# Patient Record
Sex: FEMALE | Race: WHITE | Employment: OTHER | URBAN - METROPOLITAN AREA
[De-identification: names, ages, dates, MRNs, and addresses within clinical notes are randomized per-mention and may not be internally consistent; named-entity substitution may affect disease eponyms.]

---

## 2017-01-24 DIAGNOSIS — G89.4 CHRONIC PAIN SYNDROME: ICD-10-CM

## 2017-01-24 RX ORDER — PROPRANOLOL HYDROCHLORIDE 10 MG/1
TABLET ORAL
Qty: 60 TABLET | Refills: 0 | Status: SHIPPED | OUTPATIENT
Start: 2017-01-24 | End: 2017-01-30 | Stop reason: SDUPTHER

## 2017-01-24 RX ORDER — PROPRANOLOL HYDROCHLORIDE 10 MG/1
TABLET ORAL
Qty: 60 TABLET | Refills: 0 | OUTPATIENT
Start: 2017-01-24

## 2017-01-24 RX ORDER — QUETIAPINE FUMARATE 25 MG/1
TABLET, FILM COATED ORAL
Qty: 30 TABLET | Refills: 0 | OUTPATIENT
Start: 2017-01-24

## 2017-01-24 RX ORDER — QUETIAPINE FUMARATE 25 MG/1
TABLET, FILM COATED ORAL
Qty: 30 TABLET | Refills: 0 | Status: SHIPPED | OUTPATIENT
Start: 2017-01-24 | End: 2017-01-30 | Stop reason: SDUPTHER

## 2017-01-25 ENCOUNTER — TELEPHONE (OUTPATIENT)
Dept: ORTHOPEDIC SURGERY | Age: 82
End: 2017-01-25

## 2017-01-27 RX ORDER — OMEPRAZOLE 40 MG/1
CAPSULE, DELAYED RELEASE ORAL
Qty: 90 CAPSULE | Refills: 1 | Status: SHIPPED | OUTPATIENT
Start: 2017-01-27 | End: 2017-04-12 | Stop reason: SDUPTHER

## 2017-01-30 ENCOUNTER — OFFICE VISIT (OUTPATIENT)
Dept: INTERNAL MEDICINE CLINIC | Age: 82
End: 2017-01-30

## 2017-01-30 ENCOUNTER — OFFICE VISIT (OUTPATIENT)
Dept: PAIN MANAGEMENT | Age: 82
End: 2017-01-30

## 2017-01-30 VITALS
BODY MASS INDEX: 23.83 KG/M2 | HEART RATE: 80 BPM | DIASTOLIC BLOOD PRESSURE: 74 MMHG | WEIGHT: 122 LBS | SYSTOLIC BLOOD PRESSURE: 132 MMHG

## 2017-01-30 VITALS
BODY MASS INDEX: 23.98 KG/M2 | TEMPERATURE: 97.4 F | SYSTOLIC BLOOD PRESSURE: 138 MMHG | HEART RATE: 48 BPM | DIASTOLIC BLOOD PRESSURE: 64 MMHG | WEIGHT: 122.8 LBS

## 2017-01-30 DIAGNOSIS — G89.4 CHRONIC PAIN SYNDROME: ICD-10-CM

## 2017-01-30 DIAGNOSIS — M47.816 OSTEOARTHRITIS OF LUMBAR SPINE, UNSPECIFIED SPINAL OSTEOARTHRITIS COMPLICATION STATUS: ICD-10-CM

## 2017-01-30 DIAGNOSIS — M96.1 POST-LAMINECTOMY SYNDROME: ICD-10-CM

## 2017-01-30 DIAGNOSIS — M48.061 SPINAL STENOSIS OF LUMBAR REGION: ICD-10-CM

## 2017-01-30 DIAGNOSIS — M25.521 ELBOW PAIN, CHRONIC, RIGHT: ICD-10-CM

## 2017-01-30 DIAGNOSIS — R21 SKIN RASH: Primary | ICD-10-CM

## 2017-01-30 DIAGNOSIS — G89.29 ELBOW PAIN, CHRONIC, RIGHT: ICD-10-CM

## 2017-01-30 PROCEDURE — 99213 OFFICE O/P EST LOW 20 MIN: CPT | Performed by: INTERNAL MEDICINE

## 2017-01-30 PROCEDURE — 99212 OFFICE O/P EST SF 10 MIN: CPT | Performed by: INTERNAL MEDICINE

## 2017-01-30 RX ORDER — DULOXETIN HYDROCHLORIDE 60 MG/1
CAPSULE, DELAYED RELEASE ORAL
Qty: 30 CAPSULE | Refills: 0 | Status: SHIPPED | OUTPATIENT
Start: 2017-01-30 | End: 2017-03-06 | Stop reason: SDUPTHER

## 2017-01-30 RX ORDER — OXYCODONE AND ACETAMINOPHEN 7.5; 325 MG/1; MG/1
1 TABLET ORAL EVERY 6 HOURS PRN
Qty: 112 TABLET | Refills: 0 | Status: SHIPPED | OUTPATIENT
Start: 2017-01-30 | End: 2017-03-06 | Stop reason: SDUPTHER

## 2017-01-30 RX ORDER — TRIAMCINOLONE ACETONIDE 5 MG/G
OINTMENT TOPICAL
Qty: 60 G | Refills: 2 | Status: SHIPPED | OUTPATIENT
Start: 2017-01-30 | End: 2017-02-06

## 2017-01-30 RX ORDER — GABAPENTIN 300 MG/1
CAPSULE ORAL
Qty: 120 CAPSULE | Refills: 0 | Status: SHIPPED | OUTPATIENT
Start: 2017-01-30 | End: 2017-03-06 | Stop reason: SDUPTHER

## 2017-01-30 RX ORDER — QUETIAPINE FUMARATE 25 MG/1
TABLET, FILM COATED ORAL
Qty: 30 TABLET | Refills: 0 | Status: SHIPPED | OUTPATIENT
Start: 2017-01-30 | End: 2017-03-06 | Stop reason: SDUPTHER

## 2017-01-30 RX ORDER — PROPRANOLOL HYDROCHLORIDE 10 MG/1
TABLET ORAL
Qty: 60 TABLET | Refills: 0 | Status: SHIPPED | OUTPATIENT
Start: 2017-01-30 | End: 2017-03-06 | Stop reason: SDUPTHER

## 2017-01-30 RX ORDER — DOCUSATE SODIUM 100 MG/1
100 CAPSULE, LIQUID FILLED ORAL 2 TIMES DAILY PRN
Qty: 60 CAPSULE | Refills: 0 | Status: SHIPPED | OUTPATIENT
Start: 2017-01-30 | End: 2017-03-06 | Stop reason: SDUPTHER

## 2017-01-30 RX ORDER — MELOXICAM 15 MG/1
TABLET ORAL
Qty: 30 TABLET | Refills: 0 | Status: SHIPPED | OUTPATIENT
Start: 2017-01-30 | End: 2017-03-06 | Stop reason: SDUPTHER

## 2017-01-31 ENCOUNTER — TELEPHONE (OUTPATIENT)
Dept: INTERNAL MEDICINE CLINIC | Age: 82
End: 2017-01-31

## 2017-02-01 RX ORDER — CLOPIDOGREL BISULFATE 75 MG/1
75 TABLET ORAL DAILY
Qty: 90 TABLET | Refills: 1 | OUTPATIENT
Start: 2017-02-01 | End: 2017-04-12 | Stop reason: SDUPTHER

## 2017-03-06 ENCOUNTER — OFFICE VISIT (OUTPATIENT)
Dept: PAIN MANAGEMENT | Age: 82
End: 2017-03-06

## 2017-03-06 VITALS
HEART RATE: 52 BPM | WEIGHT: 119 LBS | DIASTOLIC BLOOD PRESSURE: 82 MMHG | SYSTOLIC BLOOD PRESSURE: 140 MMHG | BODY MASS INDEX: 23.24 KG/M2

## 2017-03-06 DIAGNOSIS — G89.4 CHRONIC PAIN SYNDROME: ICD-10-CM

## 2017-03-06 DIAGNOSIS — M47.816 OSTEOARTHRITIS OF LUMBAR SPINE, UNSPECIFIED SPINAL OSTEOARTHRITIS COMPLICATION STATUS: ICD-10-CM

## 2017-03-06 DIAGNOSIS — F32.A MOOD DISORDER OF DEPRESSED TYPE: ICD-10-CM

## 2017-03-06 DIAGNOSIS — M25.521 ELBOW PAIN, CHRONIC, RIGHT: ICD-10-CM

## 2017-03-06 DIAGNOSIS — M48.061 SPINAL STENOSIS OF LUMBAR REGION: ICD-10-CM

## 2017-03-06 DIAGNOSIS — S13.9XXA SPRAIN OF CERVICAL NECK, INITIAL ENCOUNTER: ICD-10-CM

## 2017-03-06 DIAGNOSIS — M96.1 POST-LAMINECTOMY SYNDROME: ICD-10-CM

## 2017-03-06 DIAGNOSIS — G89.29 ELBOW PAIN, CHRONIC, RIGHT: ICD-10-CM

## 2017-03-06 PROCEDURE — 99213 OFFICE O/P EST LOW 20 MIN: CPT | Performed by: INTERNAL MEDICINE

## 2017-03-06 RX ORDER — MELOXICAM 15 MG/1
TABLET ORAL
Qty: 30 TABLET | Refills: 0 | Status: SHIPPED | OUTPATIENT
Start: 2017-03-06 | End: 2017-04-06 | Stop reason: SDUPTHER

## 2017-03-06 RX ORDER — DOCUSATE SODIUM 100 MG/1
100 CAPSULE, LIQUID FILLED ORAL 2 TIMES DAILY PRN
Qty: 60 CAPSULE | Refills: 0 | Status: SHIPPED | OUTPATIENT
Start: 2017-03-06 | End: 2017-04-06

## 2017-03-06 RX ORDER — DULOXETIN HYDROCHLORIDE 60 MG/1
CAPSULE, DELAYED RELEASE ORAL
Qty: 30 CAPSULE | Refills: 0 | Status: SHIPPED | OUTPATIENT
Start: 2017-03-06 | End: 2017-03-06 | Stop reason: SDUPTHER

## 2017-03-06 RX ORDER — OXYCODONE AND ACETAMINOPHEN 7.5; 325 MG/1; MG/1
1 TABLET ORAL EVERY 6 HOURS PRN
Qty: 112 TABLET | Refills: 0 | Status: SHIPPED | OUTPATIENT
Start: 2017-03-06 | End: 2017-04-06 | Stop reason: SDUPTHER

## 2017-03-06 RX ORDER — DULOXETIN HYDROCHLORIDE 60 MG/1
CAPSULE, DELAYED RELEASE ORAL
Qty: 30 CAPSULE | Refills: 0 | Status: SHIPPED | OUTPATIENT
Start: 2017-03-06 | End: 2017-04-06 | Stop reason: SDUPTHER

## 2017-03-06 RX ORDER — GABAPENTIN 300 MG/1
CAPSULE ORAL
Qty: 120 CAPSULE | Refills: 0 | Status: SHIPPED | OUTPATIENT
Start: 2017-03-06 | End: 2017-04-06 | Stop reason: SDUPTHER

## 2017-03-06 RX ORDER — PROPRANOLOL HYDROCHLORIDE 10 MG/1
TABLET ORAL
Qty: 60 TABLET | Refills: 0 | Status: SHIPPED | OUTPATIENT
Start: 2017-03-06 | End: 2017-04-06 | Stop reason: SDUPTHER

## 2017-03-06 RX ORDER — QUETIAPINE FUMARATE 25 MG/1
TABLET, FILM COATED ORAL
Qty: 30 TABLET | Refills: 0 | Status: SHIPPED | OUTPATIENT
Start: 2017-03-06 | End: 2017-04-06

## 2017-03-21 DIAGNOSIS — G89.4 CHRONIC PAIN SYNDROME: ICD-10-CM

## 2017-03-23 ENCOUNTER — TELEPHONE (OUTPATIENT)
Dept: INTERNAL MEDICINE CLINIC | Age: 82
End: 2017-03-23

## 2017-03-23 RX ORDER — QUETIAPINE FUMARATE 25 MG/1
TABLET, FILM COATED ORAL
Qty: 30 TABLET | Refills: 0 | Status: SHIPPED | OUTPATIENT
Start: 2017-03-23 | End: 2017-04-06 | Stop reason: SDUPTHER

## 2017-04-03 ENCOUNTER — TELEPHONE (OUTPATIENT)
Dept: INTERNAL MEDICINE CLINIC | Age: 82
End: 2017-04-03

## 2017-04-06 ENCOUNTER — OFFICE VISIT (OUTPATIENT)
Dept: PAIN MANAGEMENT | Age: 82
End: 2017-04-06

## 2017-04-06 VITALS
DIASTOLIC BLOOD PRESSURE: 80 MMHG | SYSTOLIC BLOOD PRESSURE: 133 MMHG | WEIGHT: 122 LBS | BODY MASS INDEX: 23.83 KG/M2 | HEART RATE: 89 BPM

## 2017-04-06 DIAGNOSIS — M48.061 SPINAL STENOSIS OF LUMBAR REGION: ICD-10-CM

## 2017-04-06 DIAGNOSIS — M47.816 OSTEOARTHRITIS OF LUMBAR SPINE, UNSPECIFIED SPINAL OSTEOARTHRITIS COMPLICATION STATUS: ICD-10-CM

## 2017-04-06 DIAGNOSIS — M96.1 POST-LAMINECTOMY SYNDROME: ICD-10-CM

## 2017-04-06 DIAGNOSIS — G89.4 CHRONIC PAIN SYNDROME: ICD-10-CM

## 2017-04-06 PROCEDURE — 99213 OFFICE O/P EST LOW 20 MIN: CPT | Performed by: INTERNAL MEDICINE

## 2017-04-06 RX ORDER — DULOXETIN HYDROCHLORIDE 60 MG/1
CAPSULE, DELAYED RELEASE ORAL
Qty: 30 CAPSULE | Refills: 0 | Status: SHIPPED | OUTPATIENT
Start: 2017-04-06 | End: 2017-05-04 | Stop reason: SDUPTHER

## 2017-04-06 RX ORDER — OXYCODONE AND ACETAMINOPHEN 7.5; 325 MG/1; MG/1
1 TABLET ORAL EVERY 6 HOURS PRN
Qty: 112 TABLET | Refills: 0 | Status: SHIPPED | OUTPATIENT
Start: 2017-04-06 | End: 2017-05-04 | Stop reason: SDUPTHER

## 2017-04-06 RX ORDER — QUETIAPINE FUMARATE 25 MG/1
TABLET, FILM COATED ORAL
Qty: 30 TABLET | Refills: 0 | Status: SHIPPED | OUTPATIENT
Start: 2017-04-06 | End: 2017-05-04 | Stop reason: SDUPTHER

## 2017-04-06 RX ORDER — GABAPENTIN 300 MG/1
CAPSULE ORAL
Qty: 120 CAPSULE | Refills: 0 | Status: SHIPPED | OUTPATIENT
Start: 2017-04-06 | End: 2017-05-04 | Stop reason: SDUPTHER

## 2017-04-06 RX ORDER — PROPRANOLOL HYDROCHLORIDE 10 MG/1
TABLET ORAL
Qty: 60 TABLET | Refills: 0 | Status: SHIPPED | OUTPATIENT
Start: 2017-04-06 | End: 2017-05-04

## 2017-04-06 RX ORDER — MELOXICAM 15 MG/1
TABLET ORAL
Qty: 30 TABLET | Refills: 0 | Status: SHIPPED | OUTPATIENT
Start: 2017-04-06 | End: 2017-05-04 | Stop reason: SDUPTHER

## 2017-04-08 DIAGNOSIS — M47.816 OSTEOARTHRITIS OF LUMBAR SPINE, UNSPECIFIED SPINAL OSTEOARTHRITIS COMPLICATION STATUS: ICD-10-CM

## 2017-04-08 DIAGNOSIS — G89.29 ELBOW PAIN, CHRONIC, RIGHT: ICD-10-CM

## 2017-04-08 DIAGNOSIS — G89.4 CHRONIC PAIN SYNDROME: ICD-10-CM

## 2017-04-08 DIAGNOSIS — M48.061 SPINAL STENOSIS OF LUMBAR REGION: ICD-10-CM

## 2017-04-08 DIAGNOSIS — M96.1 POST-LAMINECTOMY SYNDROME: ICD-10-CM

## 2017-04-08 DIAGNOSIS — M25.521 ELBOW PAIN, CHRONIC, RIGHT: ICD-10-CM

## 2017-04-11 ENCOUNTER — TELEPHONE (OUTPATIENT)
Dept: INTERNAL MEDICINE CLINIC | Age: 82
End: 2017-04-11

## 2017-04-12 RX ORDER — CLOPIDOGREL BISULFATE 75 MG/1
75 TABLET ORAL DAILY
Qty: 90 TABLET | Refills: 1 | Status: SHIPPED | OUTPATIENT
Start: 2017-04-12 | End: 2017-08-16

## 2017-04-12 RX ORDER — ASPIRIN 81 MG/1
81 TABLET ORAL DAILY
Qty: 90 TABLET | Refills: 1 | Status: SHIPPED | OUTPATIENT
Start: 2017-04-12 | End: 2017-08-16

## 2017-04-12 RX ORDER — GABAPENTIN 300 MG/1
CAPSULE ORAL
Qty: 120 CAPSULE | Refills: 0 | OUTPATIENT
Start: 2017-04-12

## 2017-04-12 RX ORDER — OMEPRAZOLE 40 MG/1
CAPSULE, DELAYED RELEASE ORAL
Qty: 90 CAPSULE | Refills: 1 | Status: SHIPPED | OUTPATIENT
Start: 2017-04-12 | End: 2017-07-03 | Stop reason: SDUPTHER

## 2017-04-12 RX ORDER — DULOXETIN HYDROCHLORIDE 60 MG/1
CAPSULE, DELAYED RELEASE ORAL
Qty: 30 CAPSULE | Refills: 0 | OUTPATIENT
Start: 2017-04-12

## 2017-05-04 ENCOUNTER — OFFICE VISIT (OUTPATIENT)
Dept: PAIN MANAGEMENT | Age: 82
End: 2017-05-04

## 2017-05-04 VITALS
HEART RATE: 50 BPM | DIASTOLIC BLOOD PRESSURE: 96 MMHG | SYSTOLIC BLOOD PRESSURE: 191 MMHG | BODY MASS INDEX: 24.14 KG/M2 | WEIGHT: 123.6 LBS

## 2017-05-04 DIAGNOSIS — M48.061 SPINAL STENOSIS OF LUMBAR REGION: ICD-10-CM

## 2017-05-04 DIAGNOSIS — M47.816 OSTEOARTHRITIS OF LUMBAR SPINE, UNSPECIFIED SPINAL OSTEOARTHRITIS COMPLICATION STATUS: ICD-10-CM

## 2017-05-04 DIAGNOSIS — G89.4 CHRONIC PAIN SYNDROME: ICD-10-CM

## 2017-05-04 DIAGNOSIS — M96.1 POST-LAMINECTOMY SYNDROME: ICD-10-CM

## 2017-05-04 PROCEDURE — 99213 OFFICE O/P EST LOW 20 MIN: CPT | Performed by: INTERNAL MEDICINE

## 2017-05-04 RX ORDER — MELOXICAM 15 MG/1
TABLET ORAL
Qty: 30 TABLET | Refills: 0 | Status: SHIPPED | OUTPATIENT
Start: 2017-05-04 | End: 2017-06-26 | Stop reason: SDUPTHER

## 2017-05-04 RX ORDER — QUETIAPINE FUMARATE 25 MG/1
TABLET, FILM COATED ORAL
Qty: 30 TABLET | Refills: 0 | Status: SHIPPED | OUTPATIENT
Start: 2017-05-04 | End: 2017-06-26 | Stop reason: SDUPTHER

## 2017-05-04 RX ORDER — OXYCODONE AND ACETAMINOPHEN 7.5; 325 MG/1; MG/1
1 TABLET ORAL EVERY 6 HOURS PRN
Qty: 140 TABLET | Refills: 0 | Status: SHIPPED | OUTPATIENT
Start: 2017-05-04 | End: 2017-06-26 | Stop reason: SDUPTHER

## 2017-05-04 RX ORDER — GABAPENTIN 300 MG/1
CAPSULE ORAL
Qty: 120 CAPSULE | Refills: 0 | Status: SHIPPED | OUTPATIENT
Start: 2017-05-04 | End: 2017-06-26 | Stop reason: SDUPTHER

## 2017-05-04 RX ORDER — DULOXETIN HYDROCHLORIDE 60 MG/1
CAPSULE, DELAYED RELEASE ORAL
Qty: 30 CAPSULE | Refills: 0 | Status: SHIPPED | OUTPATIENT
Start: 2017-05-04 | End: 2017-06-26 | Stop reason: SDUPTHER

## 2017-05-05 ENCOUNTER — TELEPHONE (OUTPATIENT)
Dept: INTERNAL MEDICINE CLINIC | Age: 82
End: 2017-05-05

## 2017-05-16 ENCOUNTER — OFFICE VISIT (OUTPATIENT)
Dept: INTERNAL MEDICINE CLINIC | Age: 82
End: 2017-05-16

## 2017-05-16 VITALS
WEIGHT: 121 LBS | BODY MASS INDEX: 23.63 KG/M2 | TEMPERATURE: 98 F | DIASTOLIC BLOOD PRESSURE: 74 MMHG | OXYGEN SATURATION: 98 % | SYSTOLIC BLOOD PRESSURE: 164 MMHG | HEART RATE: 59 BPM

## 2017-05-16 DIAGNOSIS — R21 RASH AND NONSPECIFIC SKIN ERUPTION: ICD-10-CM

## 2017-05-16 DIAGNOSIS — I10 ESSENTIAL HYPERTENSION: Primary | ICD-10-CM

## 2017-05-16 PROCEDURE — 99213 OFFICE O/P EST LOW 20 MIN: CPT | Performed by: NURSE PRACTITIONER

## 2017-05-16 RX ORDER — ALBUTEROL SULFATE 90 UG/1
AEROSOL, METERED RESPIRATORY (INHALATION)
Qty: 1 INHALER | Refills: 5 | Status: SHIPPED | OUTPATIENT
Start: 2017-05-16 | End: 2018-09-05 | Stop reason: SDUPTHER

## 2017-05-16 RX ORDER — BLOOD PRESSURE TEST KIT
KIT MISCELLANEOUS
Qty: 1 KIT | Refills: 0 | Status: SHIPPED | OUTPATIENT
Start: 2017-05-16 | End: 2018-01-01 | Stop reason: CLARIF

## 2017-05-16 RX ORDER — LISINOPRIL 5 MG/1
5 TABLET ORAL DAILY
Qty: 30 TABLET | Refills: 3 | Status: ON HOLD | OUTPATIENT
Start: 2017-05-16 | End: 2017-06-14 | Stop reason: HOSPADM

## 2017-06-08 ENCOUNTER — TELEPHONE (OUTPATIENT)
Dept: PAIN MANAGEMENT | Age: 82
End: 2017-06-08

## 2017-06-26 ENCOUNTER — OFFICE VISIT (OUTPATIENT)
Dept: PAIN MANAGEMENT | Age: 82
End: 2017-06-26

## 2017-06-26 VITALS
WEIGHT: 122 LBS | BODY MASS INDEX: 23.83 KG/M2 | HEART RATE: 69 BPM | DIASTOLIC BLOOD PRESSURE: 89 MMHG | SYSTOLIC BLOOD PRESSURE: 116 MMHG

## 2017-06-26 DIAGNOSIS — I77.9 PERIPHERAL ARTERIAL OCCLUSIVE DISEASE (HCC): ICD-10-CM

## 2017-06-26 DIAGNOSIS — G89.4 CHRONIC PAIN SYNDROME: ICD-10-CM

## 2017-06-26 DIAGNOSIS — M48.061 SPINAL STENOSIS OF LUMBAR REGION: ICD-10-CM

## 2017-06-26 DIAGNOSIS — M96.1 POST-LAMINECTOMY SYNDROME: ICD-10-CM

## 2017-06-26 PROCEDURE — 99213 OFFICE O/P EST LOW 20 MIN: CPT | Performed by: INTERNAL MEDICINE

## 2017-06-26 RX ORDER — DULOXETIN HYDROCHLORIDE 60 MG/1
CAPSULE, DELAYED RELEASE ORAL
Qty: 30 CAPSULE | Refills: 0 | Status: SHIPPED | OUTPATIENT
Start: 2017-06-26 | End: 2017-07-24 | Stop reason: SDUPTHER

## 2017-06-26 RX ORDER — OXYCODONE AND ACETAMINOPHEN 7.5; 325 MG/1; MG/1
1 TABLET ORAL EVERY 6 HOURS PRN
Qty: 112 TABLET | Refills: 0 | Status: SHIPPED | OUTPATIENT
Start: 2017-06-26 | End: 2017-07-24 | Stop reason: SDUPTHER

## 2017-06-26 RX ORDER — MELOXICAM 15 MG/1
TABLET ORAL
Qty: 30 TABLET | Refills: 0 | Status: SHIPPED | OUTPATIENT
Start: 2017-06-26 | End: 2017-07-24 | Stop reason: SDUPTHER

## 2017-06-26 RX ORDER — QUETIAPINE FUMARATE 25 MG/1
TABLET, FILM COATED ORAL
Qty: 30 TABLET | Refills: 0 | Status: SHIPPED | OUTPATIENT
Start: 2017-06-26 | End: 2017-07-24 | Stop reason: SDUPTHER

## 2017-06-26 RX ORDER — GABAPENTIN 300 MG/1
CAPSULE ORAL
Qty: 120 CAPSULE | Refills: 0 | Status: SHIPPED | OUTPATIENT
Start: 2017-06-26 | End: 2017-07-24 | Stop reason: SDUPTHER

## 2017-07-03 ENCOUNTER — OFFICE VISIT (OUTPATIENT)
Dept: INTERNAL MEDICINE CLINIC | Age: 82
End: 2017-07-03

## 2017-07-03 VITALS
HEIGHT: 59 IN | HEART RATE: 92 BPM | WEIGHT: 120.6 LBS | BODY MASS INDEX: 24.31 KG/M2 | TEMPERATURE: 98.3 F | DIASTOLIC BLOOD PRESSURE: 78 MMHG | SYSTOLIC BLOOD PRESSURE: 136 MMHG

## 2017-07-03 DIAGNOSIS — I10 ESSENTIAL HYPERTENSION: ICD-10-CM

## 2017-07-03 DIAGNOSIS — I71.40 ABDOMINAL AORTIC ANEURYSM (AAA) WITHOUT RUPTURE: Primary | ICD-10-CM

## 2017-07-03 DIAGNOSIS — K21.9 GASTROESOPHAGEAL REFLUX DISEASE WITHOUT ESOPHAGITIS: ICD-10-CM

## 2017-07-03 PROCEDURE — 99213 OFFICE O/P EST LOW 20 MIN: CPT | Performed by: NURSE PRACTITIONER

## 2017-07-03 RX ORDER — OMEPRAZOLE 40 MG/1
CAPSULE, DELAYED RELEASE ORAL
Qty: 180 CAPSULE | Refills: 1 | Status: SHIPPED | OUTPATIENT
Start: 2017-07-03

## 2017-07-24 ENCOUNTER — OFFICE VISIT (OUTPATIENT)
Dept: PAIN MANAGEMENT | Age: 82
End: 2017-07-24

## 2017-07-24 VITALS
WEIGHT: 121 LBS | HEART RATE: 48 BPM | DIASTOLIC BLOOD PRESSURE: 89 MMHG | SYSTOLIC BLOOD PRESSURE: 140 MMHG | BODY MASS INDEX: 24.44 KG/M2

## 2017-07-24 DIAGNOSIS — M96.1 POST-LAMINECTOMY SYNDROME: ICD-10-CM

## 2017-07-24 DIAGNOSIS — G89.4 CHRONIC PAIN SYNDROME: ICD-10-CM

## 2017-07-24 DIAGNOSIS — M48.061 SPINAL STENOSIS OF LUMBAR REGION: ICD-10-CM

## 2017-07-24 PROCEDURE — 99213 OFFICE O/P EST LOW 20 MIN: CPT | Performed by: INTERNAL MEDICINE

## 2017-07-24 RX ORDER — GABAPENTIN 300 MG/1
CAPSULE ORAL
Qty: 120 CAPSULE | Refills: 1 | Status: SHIPPED | OUTPATIENT
Start: 2017-07-24 | End: 2017-08-29 | Stop reason: SDUPTHER

## 2017-07-24 RX ORDER — QUETIAPINE FUMARATE 25 MG/1
TABLET, FILM COATED ORAL
Qty: 30 TABLET | Refills: 0 | Status: SHIPPED | OUTPATIENT
Start: 2017-07-24 | End: 2017-08-29 | Stop reason: SDUPTHER

## 2017-07-24 RX ORDER — DULOXETIN HYDROCHLORIDE 60 MG/1
CAPSULE, DELAYED RELEASE ORAL
Qty: 30 CAPSULE | Refills: 1 | Status: SHIPPED | OUTPATIENT
Start: 2017-07-24 | End: 2017-08-29 | Stop reason: SDUPTHER

## 2017-07-24 RX ORDER — MELOXICAM 15 MG/1
TABLET ORAL
Qty: 30 TABLET | Refills: 1 | Status: SHIPPED | OUTPATIENT
Start: 2017-07-24 | End: 2017-08-29 | Stop reason: SDUPTHER

## 2017-07-24 RX ORDER — OXYCODONE AND ACETAMINOPHEN 7.5; 325 MG/1; MG/1
1 TABLET ORAL EVERY 6 HOURS PRN
Qty: 140 TABLET | Refills: 0 | Status: SHIPPED | OUTPATIENT
Start: 2017-07-24 | End: 2017-08-29 | Stop reason: SDUPTHER

## 2017-08-09 ENCOUNTER — TELEPHONE (OUTPATIENT)
Dept: INTERNAL MEDICINE CLINIC | Age: 82
End: 2017-08-09

## 2017-08-10 ENCOUNTER — TELEPHONE (OUTPATIENT)
Dept: INTERNAL MEDICINE CLINIC | Age: 82
End: 2017-08-10

## 2017-08-11 ENCOUNTER — TELEPHONE (OUTPATIENT)
Dept: INTERNAL MEDICINE CLINIC | Age: 82
End: 2017-08-11

## 2017-08-11 DIAGNOSIS — L60.8 NAIL DISCOLORATION: Primary | ICD-10-CM

## 2017-08-14 ENCOUNTER — OFFICE VISIT (OUTPATIENT)
Dept: INTERNAL MEDICINE CLINIC | Age: 82
End: 2017-08-14

## 2017-08-14 VITALS
BODY MASS INDEX: 23.75 KG/M2 | OXYGEN SATURATION: 97 % | SYSTOLIC BLOOD PRESSURE: 142 MMHG | TEMPERATURE: 97.4 F | WEIGHT: 117.6 LBS | HEART RATE: 52 BPM | DIASTOLIC BLOOD PRESSURE: 80 MMHG

## 2017-08-14 DIAGNOSIS — I48.19 PERSISTENT ATRIAL FIBRILLATION (HCC): ICD-10-CM

## 2017-08-14 DIAGNOSIS — M19.90 ARTHRITIS: ICD-10-CM

## 2017-08-14 DIAGNOSIS — B35.1 ONYCHOMYCOSIS: Primary | ICD-10-CM

## 2017-08-14 PROCEDURE — 99213 OFFICE O/P EST LOW 20 MIN: CPT | Performed by: NURSE PRACTITIONER

## 2017-08-14 RX ORDER — TERBINAFINE HYDROCHLORIDE 250 MG/1
250 TABLET ORAL DAILY
Qty: 90 TABLET | Refills: 0 | Status: SHIPPED | OUTPATIENT
Start: 2017-08-14 | End: 2017-11-18 | Stop reason: SDUPTHER

## 2017-08-14 ASSESSMENT — PATIENT HEALTH QUESTIONNAIRE - PHQ9
SUM OF ALL RESPONSES TO PHQ QUESTIONS 1-9: 0
1. LITTLE INTEREST OR PLEASURE IN DOING THINGS: 0
2. FEELING DOWN, DEPRESSED OR HOPELESS: 0
SUM OF ALL RESPONSES TO PHQ9 QUESTIONS 1 & 2: 0

## 2017-08-16 ENCOUNTER — OFFICE VISIT (OUTPATIENT)
Dept: CARDIOLOGY CLINIC | Age: 82
End: 2017-08-16

## 2017-08-16 VITALS
DIASTOLIC BLOOD PRESSURE: 58 MMHG | HEART RATE: 60 BPM | BODY MASS INDEX: 20.55 KG/M2 | SYSTOLIC BLOOD PRESSURE: 120 MMHG | WEIGHT: 116 LBS | HEIGHT: 63 IN

## 2017-08-16 DIAGNOSIS — I36.1 NON-RHEUMATIC TRICUSPID VALVE INSUFFICIENCY: ICD-10-CM

## 2017-08-16 DIAGNOSIS — I71.40 ABDOMINAL AORTIC ANEURYSM (AAA) WITHOUT RUPTURE: ICD-10-CM

## 2017-08-16 DIAGNOSIS — I34.0 NONRHEUMATIC MITRAL VALVE REGURGITATION: ICD-10-CM

## 2017-08-16 DIAGNOSIS — I77.9 PERIPHERAL ARTERIAL OCCLUSIVE DISEASE (HCC): ICD-10-CM

## 2017-08-16 DIAGNOSIS — I48.0 PAROXYSMAL ATRIAL FIBRILLATION (HCC): Primary | ICD-10-CM

## 2017-08-16 DIAGNOSIS — I10 ESSENTIAL HYPERTENSION: ICD-10-CM

## 2017-08-16 PROCEDURE — 99215 OFFICE O/P EST HI 40 MIN: CPT | Performed by: INTERNAL MEDICINE

## 2017-08-29 ENCOUNTER — OFFICE VISIT (OUTPATIENT)
Dept: PAIN MANAGEMENT | Age: 82
End: 2017-08-29

## 2017-08-29 VITALS
DIASTOLIC BLOOD PRESSURE: 103 MMHG | BODY MASS INDEX: 21.08 KG/M2 | SYSTOLIC BLOOD PRESSURE: 201 MMHG | WEIGHT: 119 LBS | HEART RATE: 45 BPM

## 2017-08-29 DIAGNOSIS — M96.1 POST-LAMINECTOMY SYNDROME: ICD-10-CM

## 2017-08-29 DIAGNOSIS — M47.816 OSTEOARTHRITIS OF LUMBAR SPINE, UNSPECIFIED SPINAL OSTEOARTHRITIS COMPLICATION STATUS: ICD-10-CM

## 2017-08-29 DIAGNOSIS — M48.061 SPINAL STENOSIS OF LUMBAR REGION: ICD-10-CM

## 2017-08-29 DIAGNOSIS — G89.4 CHRONIC PAIN SYNDROME: ICD-10-CM

## 2017-08-29 PROCEDURE — 99213 OFFICE O/P EST LOW 20 MIN: CPT | Performed by: INTERNAL MEDICINE

## 2017-08-29 RX ORDER — OXYCODONE AND ACETAMINOPHEN 7.5; 325 MG/1; MG/1
1 TABLET ORAL EVERY 6 HOURS PRN
Qty: 112 TABLET | Refills: 0 | Status: SHIPPED | OUTPATIENT
Start: 2017-08-29 | End: 2017-10-03 | Stop reason: SDUPTHER

## 2017-08-29 RX ORDER — QUETIAPINE FUMARATE 25 MG/1
TABLET, FILM COATED ORAL
Qty: 30 TABLET | Refills: 0 | Status: SHIPPED | OUTPATIENT
Start: 2017-08-29 | End: 2017-10-03 | Stop reason: SDUPTHER

## 2017-08-29 RX ORDER — GABAPENTIN 300 MG/1
CAPSULE ORAL
Qty: 120 CAPSULE | Refills: 1 | Status: SHIPPED | OUTPATIENT
Start: 2017-08-29 | End: 2017-10-03 | Stop reason: SDUPTHER

## 2017-08-29 RX ORDER — DULOXETIN HYDROCHLORIDE 60 MG/1
CAPSULE, DELAYED RELEASE ORAL
Qty: 30 CAPSULE | Refills: 1 | Status: SHIPPED | OUTPATIENT
Start: 2017-08-29 | End: 2017-10-03 | Stop reason: SDUPTHER

## 2017-08-29 RX ORDER — MELOXICAM 15 MG/1
TABLET ORAL
Qty: 30 TABLET | Refills: 1 | Status: SHIPPED | OUTPATIENT
Start: 2017-08-29 | End: 2017-10-03 | Stop reason: SDUPTHER

## 2017-09-25 ENCOUNTER — TELEPHONE (OUTPATIENT)
Dept: INTERNAL MEDICINE CLINIC | Age: 82
End: 2017-09-25

## 2017-10-03 ENCOUNTER — OFFICE VISIT (OUTPATIENT)
Dept: PAIN MANAGEMENT | Age: 82
End: 2017-10-03

## 2017-10-03 VITALS
DIASTOLIC BLOOD PRESSURE: 66 MMHG | WEIGHT: 112 LBS | HEART RATE: 58 BPM | BODY MASS INDEX: 19.84 KG/M2 | SYSTOLIC BLOOD PRESSURE: 136 MMHG

## 2017-10-03 DIAGNOSIS — M48.061 SPINAL STENOSIS OF LUMBAR REGION: ICD-10-CM

## 2017-10-03 DIAGNOSIS — M96.1 POST-LAMINECTOMY SYNDROME: ICD-10-CM

## 2017-10-03 DIAGNOSIS — G89.4 CHRONIC PAIN SYNDROME: ICD-10-CM

## 2017-10-03 DIAGNOSIS — M47.816 OSTEOARTHRITIS OF LUMBAR SPINE, UNSPECIFIED SPINAL OSTEOARTHRITIS COMPLICATION STATUS: ICD-10-CM

## 2017-10-03 PROCEDURE — 99213 OFFICE O/P EST LOW 20 MIN: CPT | Performed by: INTERNAL MEDICINE

## 2017-10-03 RX ORDER — MELOXICAM 15 MG/1
TABLET ORAL
Qty: 30 TABLET | Refills: 0 | Status: SHIPPED | OUTPATIENT
Start: 2017-10-03 | End: 2017-10-30 | Stop reason: SDUPTHER

## 2017-10-03 RX ORDER — QUETIAPINE FUMARATE 25 MG/1
TABLET, FILM COATED ORAL
Qty: 30 TABLET | Refills: 0 | Status: SHIPPED | OUTPATIENT
Start: 2017-10-03 | End: 2017-10-30 | Stop reason: SDUPTHER

## 2017-10-03 RX ORDER — DULOXETIN HYDROCHLORIDE 60 MG/1
CAPSULE, DELAYED RELEASE ORAL
Qty: 30 CAPSULE | Refills: 0 | Status: SHIPPED | OUTPATIENT
Start: 2017-10-03 | End: 2017-10-30 | Stop reason: SDUPTHER

## 2017-10-03 RX ORDER — OXYCODONE AND ACETAMINOPHEN 7.5; 325 MG/1; MG/1
1 TABLET ORAL EVERY 6 HOURS PRN
Qty: 112 TABLET | Refills: 0 | Status: SHIPPED | OUTPATIENT
Start: 2017-10-03 | End: 2017-10-30 | Stop reason: SDUPTHER

## 2017-10-03 RX ORDER — GABAPENTIN 300 MG/1
CAPSULE ORAL
Qty: 120 CAPSULE | Refills: 0 | Status: SHIPPED | OUTPATIENT
Start: 2017-10-03 | End: 2017-10-30 | Stop reason: SDUPTHER

## 2017-10-03 NOTE — PROGRESS NOTES
Karyn Craft  1934  [id number]    HISTORY OF PRESENT ILLNESS:  Ms. Ada Bennett is a 80 y.o. female returns for a follow up visit for multiple medical problems. Her current presenting problems are   1. Chronic pain syndrome    2. Spinal stenosis of lumbar region    3. Post-laminectomy syndrome    4. Osteoarthritis of lumbar spine, unspecified spinal osteoarthritis complication status    5. Primary insomnia    6. Mood disorder of depressed type    7. Constipation, unspecified constipation type    . As per information/history obtained from the PADT(patient assessment and documentation tool) - She complains of pain in the upper back, mid back and lower back with radiation to the elbows Right, buttocks, knees Right and feet Right She rates the pain 5/10 and describes it as sharp, aching. Pain is made worse by: walking, standing, bending, lifting. Current treatment regimen has helped relieve about 50% of the pain. She denies side effects from the current pain regimen. Patient reports that since the last follow up visit the physical functioning is better, family/social relationships are better, mood is better and sleep patterns are better, and that the overall functioning is better. Patient denies neurological bowel or bladder. Patient denies misusing/abusing her narcotic pain medications or using any illegal drugs. There are No indicators for possible drug abuse, addiction or diversion problems. Upon obtaining the medical history from Ms. Ada Bennett regarding today's office visit for her presenting problems, patient states she has not been feeling well, has URI and bronchitis symptoms. She states she is smoking e-cigs still. Ms. Ada Bennett mentions using Cymbalta still. Patient's  subjective report of her mood is fair. she describes occasional symptoms of depression, occasional  irritability and some mood swings. Describes her mood as being neutral and reports some pleasure in her daily activities.  Reports fair  appetite, energy and concentration. Able to function well in different aspects of her daily activities. Denies suicidal or homicidal ideation. Denies any complaints of increased tension, does   Worry sometimes and occasional  irritability  she denies any c/o increased anxiety, No c/o panic attacks or symptoms of PTSD. Patient reports she lives with her son. ALLERGIES: Patients list of allergies were reviewed     MEDICATIONS: Ms. Jocelin Melo list of medications were reviewed. Her current medications are   Outpatient Medications Prior to Visit   Medication Sig Dispense Refill    oxyCODONE-acetaminophen (PERCOCET) 7.5-325 MG per tablet Take 1 tablet by mouth every 6 hours as needed for Pain  Max 4 per day. 112 tablet 0    QUEtiapine (SEROQUEL) 25 MG tablet TAKE 1 TABLET BY MOUTH NIGHTLY 30 tablet 0    DULoxetine (CYMBALTA) 60 MG extended release capsule TAKE 1 CAPSULE BY MOUTH DAILY.  30 capsule 1    gabapentin (NEURONTIN) 300 MG capsule Take one capsule in the morning, one capsule in the afternoon and two capsules at night 120 capsule 1    meloxicam (MOBIC) 15 MG tablet Take one tablet Monday,wednesday and Friday 30 tablet 1    rivaroxaban (XARELTO) 15 MG TABS tablet Take 1 tablet by mouth daily (with breakfast) 90 tablet 3    terbinafine (LAMISIL) 250 MG tablet Take 1 tablet by mouth daily 90 tablet 0    omeprazole (PRILOSEC) 40 MG delayed release capsule TAKE 1 CAPSULE BY MOUTH DAILY 180 capsule 1    tiotropium (SPIRIVA) 18 MCG inhalation capsule Inhale 1 capsule into the lungs daily 90 capsule 1    metoprolol tartrate (LOPRESSOR) 25 MG tablet Take 1 tablet by mouth 2 times daily 60 tablet 3    lisinopril (PRINIVIL;ZESTRIL) 20 MG tablet Take 1 tablet by mouth daily 30 tablet 3    Blood Pressure KIT Check bp once daily 1 kit 0    fluticasone-salmeterol (ADVAIR DISKUS) 250-50 MCG/DOSE AEPB INHALE 1 PUFF INTO THE LUNGS EVERY 12 HOURS 1 Inhaler 5    albuterol sulfate HFA (PROAIR HFA) 108 (90 BASE) MCG/ACT inhaler INHALE 2 PUFFS INTO THE LUNGS EVERY 4 HOURS AS NEEDED FOR WHEEZING. 1 Inhaler 5    Homeopathic Products (LEG CRAMP RELIEF PO) Take by mouth as needed      ferrous sulfate 325 (65 FE) MG EC tablet Take 325 mg by mouth daily (with breakfast)      Psyllium (METAMUCIL MULTIHEALTH FIBER PO) Take 2 capsules by mouth daily        No facility-administered medications prior to visit. SOCIAL/FAMILY/PAST MEDICAL HISTORY: Ms. Enoch Du, family and past medical history was reviewed. REVIEW OF SYSTEMS:    Respiratory: Negative for apnea, chest tightness and shortness of breath or change in baseline breathing. Gastrointestinal: Negative for nausea, vomiting, abdominal pain, diarrhea, constipation, blood in stool and abdominal distention. PHYSICAL EXAM:   Nursing note and vitals reviewed. /66 (Site: Right Arm, Position: Sitting)  Pulse 58  Wt 112 lb (50.8 kg)  Breastfeeding? No  BMI 19.84 kg/m2  Constitutional: She appears well-developed and well-nourished. No acute distress. Skin: Skin is warm and dry, good turgor. No rash noted. She is not diaphoretic. Cardiovascular: Normal rate, regular rhythm, normal heart sounds, and has 1/6 SM murmur. Pulmonary/Chest: Effort normal. No respiratory distress. She does not have wheezes in the lung fields. She has no rales. +rhonchi, + wheeze, decreased ROM     Neurological/Psychiatric:She is alert and oriented to person, place, and time. Coordination is  normal. Her mood isAppropriate and affect is Flat/blunted and Anxious . IMPRESSION:   1. Chronic pain syndrome    2. Spinal stenosis of lumbar region    3. Post-laminectomy syndrome    4.  Osteoarthritis of lumbar spine, unspecified spinal osteoarthritis complication status        PLAN:  Informed verbal consent was obtained  -ROM/stretching exercises as advised   -Continue with Percocet 3-4 times per day   -Follow up with PCP/urgent care for her COPD issues   -She was advised to machinery while adjusting the dose of medicines or if having cognitive  issues related to the current medications. Risk of overdose and death, if medicines not taken as prescribed, were also discussed. If the patient develops new symptoms or if the symptoms worsen, the patient should call the office. While transcribing every attempt was made to maintain the accuracy of the note in terms of it's contents,there may have been some errors made inadvertently. Thank you for allowing me to participate in the care of this patient. Tonya Pulliam MD.    Cc: Cinthia Rosenbaum MD    I, Shelia Chun am scribing for and in the presence of Dr. Tonya Pulliam.    10/03/17  11:10 AM  JAQUELINE Ayon, Dr. Tonya Pulliam, personally performed the services described in this documentation as scribed by   Shelia Chun MA in my presence and it is both accurate and complete

## 2017-10-30 ENCOUNTER — OFFICE VISIT (OUTPATIENT)
Dept: PAIN MANAGEMENT | Age: 82
End: 2017-10-30

## 2017-10-30 VITALS
DIASTOLIC BLOOD PRESSURE: 78 MMHG | WEIGHT: 112 LBS | SYSTOLIC BLOOD PRESSURE: 133 MMHG | BODY MASS INDEX: 19.84 KG/M2 | HEART RATE: 68 BPM

## 2017-10-30 DIAGNOSIS — R25.1 TREMORS OF NERVOUS SYSTEM: ICD-10-CM

## 2017-10-30 DIAGNOSIS — G89.4 CHRONIC PAIN SYNDROME: ICD-10-CM

## 2017-10-30 DIAGNOSIS — M47.816 LUMBAR SPONDYLOSIS: ICD-10-CM

## 2017-10-30 DIAGNOSIS — M47.816 OSTEOARTHRITIS OF LUMBAR SPINE, UNSPECIFIED SPINAL OSTEOARTHRITIS COMPLICATION STATUS: ICD-10-CM

## 2017-10-30 DIAGNOSIS — M48.062 SPINAL STENOSIS OF LUMBAR REGION WITH NEUROGENIC CLAUDICATION: ICD-10-CM

## 2017-10-30 DIAGNOSIS — M96.1 POST-LAMINECTOMY SYNDROME: ICD-10-CM

## 2017-10-30 PROCEDURE — G8598 ASA/ANTIPLAT THER USED: HCPCS | Performed by: INTERNAL MEDICINE

## 2017-10-30 PROCEDURE — G8427 DOCREV CUR MEDS BY ELIG CLIN: HCPCS | Performed by: INTERNAL MEDICINE

## 2017-10-30 PROCEDURE — 4040F PNEUMOC VAC/ADMIN/RCVD: CPT | Performed by: INTERNAL MEDICINE

## 2017-10-30 PROCEDURE — G8420 CALC BMI NORM PARAMETERS: HCPCS | Performed by: INTERNAL MEDICINE

## 2017-10-30 PROCEDURE — 1090F PRES/ABSN URINE INCON ASSESS: CPT | Performed by: INTERNAL MEDICINE

## 2017-10-30 PROCEDURE — 1123F ACP DISCUSS/DSCN MKR DOCD: CPT | Performed by: INTERNAL MEDICINE

## 2017-10-30 PROCEDURE — G8400 PT W/DXA NO RESULTS DOC: HCPCS | Performed by: INTERNAL MEDICINE

## 2017-10-30 PROCEDURE — G8484 FLU IMMUNIZE NO ADMIN: HCPCS | Performed by: INTERNAL MEDICINE

## 2017-10-30 PROCEDURE — 99213 OFFICE O/P EST LOW 20 MIN: CPT | Performed by: INTERNAL MEDICINE

## 2017-10-30 PROCEDURE — 1036F TOBACCO NON-USER: CPT | Performed by: INTERNAL MEDICINE

## 2017-10-30 RX ORDER — QUETIAPINE FUMARATE 25 MG/1
TABLET, FILM COATED ORAL
Qty: 30 TABLET | Refills: 1 | Status: SHIPPED | OUTPATIENT
Start: 2017-10-30 | End: 2017-11-28 | Stop reason: SDUPTHER

## 2017-10-30 RX ORDER — GABAPENTIN 300 MG/1
CAPSULE ORAL
Qty: 120 CAPSULE | Refills: 1 | Status: SHIPPED | OUTPATIENT
Start: 2017-10-30 | End: 2017-11-28 | Stop reason: SDUPTHER

## 2017-10-30 RX ORDER — DULOXETIN HYDROCHLORIDE 60 MG/1
CAPSULE, DELAYED RELEASE ORAL
Qty: 30 CAPSULE | Refills: 1 | Status: SHIPPED | OUTPATIENT
Start: 2017-10-30 | End: 2017-11-28 | Stop reason: SDUPTHER

## 2017-10-30 RX ORDER — MELOXICAM 15 MG/1
TABLET ORAL
Qty: 30 TABLET | Refills: 1 | Status: SHIPPED | OUTPATIENT
Start: 2017-10-30 | End: 2017-11-28 | Stop reason: SDUPTHER

## 2017-10-30 RX ORDER — OXYCODONE AND ACETAMINOPHEN 7.5; 325 MG/1; MG/1
1 TABLET ORAL EVERY 6 HOURS PRN
Qty: 120 TABLET | Refills: 0 | Status: SHIPPED | OUTPATIENT
Start: 2017-10-30 | End: 2017-11-28 | Stop reason: SDUPTHER

## 2017-10-30 NOTE — PROGRESS NOTES
Kelvin Blake  1934  [id number]    HISTORY OF PRESENT ILLNESS:  Ms. Padmini Reeves is a 80 y.o. female returns for a follow up visit for multiple medical problems. Her current presenting problems are   1. Chronic pain syndrome    2. Spinal stenosis of lumbar region with neurogenic claudication    3. Post-laminectomy syndrome    4. Lumbar spondylosis    5. Tremors of nervous system    . As per information/history obtained from the PADT(patient assessment and documentation tool) - She complains of pain in the upper back with radiation to the buttocks, hips Bilateral, upper leg Bilateral, knees Bilateral, lower leg Bilateral, ankles Bilateral and feet Bilateral She rates the pain 6/10 and describes it as sharp, aching. Pain is made worse by: movement, walking, standing, sitting, bending, lifting. Current treatment regimen has helped relieve about 30% of the pain. She denies side effects from the current pain regimen. Patient reports that since the last follow up visit the physical functioning is better, family/social relationships are better, mood is better and sleep patterns are better, and that the overall functioning is better. Patient denies neurological bowel or bladder. Patient denies misusing/abusing her narcotic pain medications or using any illegal drugs. There are No indicators for possible drug abuse, addiction or diversion problems. Upon obtaining the medical history from Ms. Padmini Reeves regarding today's office visit for her presenting problems, Patient complains the back has been hurting more on and off. She says she has been complains with the medication and they are helping some. She mentions she had a fall about 2 weeks, which she had to call the EMS. She reports she had pneumonia last visit and is done with antibiotic. She states her breathing has bene baseline, not wearing oxygen.     ALLERGIES: Patients list of allergies were reviewed     MEDICATIONS: Ms. Padmini Reeves list of medications were reviewed. Her current medications are   Outpatient Medications Prior to Visit   Medication Sig Dispense Refill    oxyCODONE-acetaminophen (PERCOCET) 7.5-325 MG per tablet Take 1 tablet by mouth every 6 hours as needed for Pain  Max 4 per day. 112 tablet 0    QUEtiapine (SEROQUEL) 25 MG tablet TAKE 1 TABLET BY MOUTH NIGHTLY 30 tablet 0    DULoxetine (CYMBALTA) 60 MG extended release capsule TAKE 1 CAPSULE BY MOUTH DAILY. 30 capsule 0    gabapentin (NEURONTIN) 300 MG capsule Take one capsule in the morning, one capsule in the afternoon and two capsules at night 120 capsule 0    meloxicam (MOBIC) 15 MG tablet Take one tablet Monday,wednesday and Friday 30 tablet 0    rivaroxaban (XARELTO) 15 MG TABS tablet Take 1 tablet by mouth daily (with breakfast) 90 tablet 3    terbinafine (LAMISIL) 250 MG tablet Take 1 tablet by mouth daily 90 tablet 0    omeprazole (PRILOSEC) 40 MG delayed release capsule TAKE 1 CAPSULE BY MOUTH DAILY 180 capsule 1    tiotropium (SPIRIVA) 18 MCG inhalation capsule Inhale 1 capsule into the lungs daily 90 capsule 1    metoprolol tartrate (LOPRESSOR) 25 MG tablet Take 1 tablet by mouth 2 times daily 60 tablet 3    lisinopril (PRINIVIL;ZESTRIL) 20 MG tablet Take 1 tablet by mouth daily 30 tablet 3    Blood Pressure KIT Check bp once daily 1 kit 0    fluticasone-salmeterol (ADVAIR DISKUS) 250-50 MCG/DOSE AEPB INHALE 1 PUFF INTO THE LUNGS EVERY 12 HOURS 1 Inhaler 5    albuterol sulfate HFA (PROAIR HFA) 108 (90 BASE) MCG/ACT inhaler INHALE 2 PUFFS INTO THE LUNGS EVERY 4 HOURS AS NEEDED FOR WHEEZING. 1 Inhaler 5    Homeopathic Products (LEG CRAMP RELIEF PO) Take by mouth as needed      ferrous sulfate 325 (65 FE) MG EC tablet Take 325 mg by mouth daily (with breakfast)      Psyllium (METAMUCIL MULTIHEALTH FIBER PO) Take 2 capsules by mouth daily        No facility-administered medications prior to visit.         SOCIAL/FAMILY/PAST MEDICAL HISTORY: Ms. Greer Avila, family and past medical history was reviewed. REVIEW OF SYSTEMS:    Respiratory: Negative for apnea, chest tightness and shortness of breath or change in baseline breathing. Gastrointestinal: Negative for nausea, vomiting, abdominal pain, diarrhea, constipation, blood in stool and abdominal distention. PHYSICAL EXAM:   Nursing note and vitals reviewed. /78 (Site: Right Arm, Position: Sitting)   Pulse 68   Wt 112 lb (50.8 kg)   Breastfeeding? No   BMI 19.84 kg/m²   Constitutional: She appears well-developed and well-nourished. No acute distress. Skin: Skin is warm and dry, good turgor. No rash noted. She is not diaphoretic. Cardiovascular: Normal rate, regular rhythm, normal heart sounds, and does not have murmur. Pulmonary/Chest: Effort normal. No respiratory distress. She does not have wheezes in the lung fields. She has no rales. Neurological/Psychiatric:She is alert and oriented to person, place, and time. Coordination is  normal. Her mood isAppropriate and affect is Flat/blunted and Anxious . Other: + Slow gait     IMPRESSION:   1. Chronic pain syndrome    2. Spinal stenosis of lumbar region with neurogenic claudication    3. Post-laminectomy syndrome    4. Lumbar spondylosis    5. Tremors of nervous system        PLAN:  Informed verbal consent was obtained  -Continue with current regimen   -Advised patient to quit smoking for  health related concerns and to improve the treatment outcomes. Education was given on quitting smoking and the use of different modalities including medications, hypnotherapy, counselling  and biofeedback. These were discussed with patient.   -Continue with Neurontin and Seroquel for sleep   -Continue with Percocet 4 per day     Current Outpatient Prescriptions   Medication Sig Dispense Refill    oxyCODONE-acetaminophen (PERCOCET) 7.5-325 MG per tablet Take 1 tablet by mouth every 6 hours as needed for Pain  Max 4 per day.  112 tablet 0    QUEtiapine (SEROQUEL) 25 MG tablet TAKE 1 TABLET BY MOUTH NIGHTLY 30 tablet 0    DULoxetine (CYMBALTA) 60 MG extended release capsule TAKE 1 CAPSULE BY MOUTH DAILY. 30 capsule 0    gabapentin (NEURONTIN) 300 MG capsule Take one capsule in the morning, one capsule in the afternoon and two capsules at night 120 capsule 0    meloxicam (MOBIC) 15 MG tablet Take one tablet Monday,wednesday and Friday 30 tablet 0    rivaroxaban (XARELTO) 15 MG TABS tablet Take 1 tablet by mouth daily (with breakfast) 90 tablet 3    terbinafine (LAMISIL) 250 MG tablet Take 1 tablet by mouth daily 90 tablet 0    omeprazole (PRILOSEC) 40 MG delayed release capsule TAKE 1 CAPSULE BY MOUTH DAILY 180 capsule 1    tiotropium (SPIRIVA) 18 MCG inhalation capsule Inhale 1 capsule into the lungs daily 90 capsule 1    metoprolol tartrate (LOPRESSOR) 25 MG tablet Take 1 tablet by mouth 2 times daily 60 tablet 3    lisinopril (PRINIVIL;ZESTRIL) 20 MG tablet Take 1 tablet by mouth daily 30 tablet 3    Blood Pressure KIT Check bp once daily 1 kit 0    fluticasone-salmeterol (ADVAIR DISKUS) 250-50 MCG/DOSE AEPB INHALE 1 PUFF INTO THE LUNGS EVERY 12 HOURS 1 Inhaler 5    albuterol sulfate HFA (PROAIR HFA) 108 (90 BASE) MCG/ACT inhaler INHALE 2 PUFFS INTO THE LUNGS EVERY 4 HOURS AS NEEDED FOR WHEEZING. 1 Inhaler 5    Homeopathic Products (LEG CRAMP RELIEF PO) Take by mouth as needed      ferrous sulfate 325 (65 FE) MG EC tablet Take 325 mg by mouth daily (with breakfast)      Psyllium (METAMUCIL MULTIHEALTH FIBER PO) Take 2 capsules by mouth daily        No current facility-administered medications for this visit. I will continue her current medication regimen  which is part of the above treatment schedule. It has been helping with Ms. Marsh's chronic  medical problems which for this visit include:   Diagnoses of Chronic pain syndrome, Spinal stenosis of lumbar region with neurogenic claudication, Post-laminectomy syndrome, Lumbar spondylosis, and Tremors of nervous system were pertinent to this visit. Risks and benefits of the medications and other alternative treatments  including no treatment were discussed with the patient. The common side effects of these medications were also explained to the patient. Informed verbal consent was obtained. Goals of current treatment regimen include improvement in pain, restoration of functioning- with focus on improvement in physical performance, general activity, work or disability,emotional distress, health care utilization and  decreased medication consumption. Will continue to monitor progress towards achieving/maintaining therapeutic goals with special emphasis on  1. Improvement in perceived interfernce  of pain with ADL's. Ability to do home exercises independently. Ability to do household chores indoor and/or outdoor work and social and leisure activities. Improve psychosocial and physical functioning. - she is showing progression towards this treatment goal with the current regimen. She was advised against drinking alcohol with the narcotic pain medicines, advised against driving or handling machinery while adjusting the dose of medicines or if having cognitive  issues related to the current medications. Risk of overdose and death, if medicines not taken as prescribed, were also discussed. If the patient develops new symptoms or if the symptoms worsen, the patient should call the office. While transcribing every attempt was made to maintain the accuracy of the note in terms of it's contents,there may have been some errors made inadvertently. Thank you for allowing me to participate in the care of this patient. Mally Edmondson MD.    Cc:  primary care provider on file. Radhika Macdonald am scribing for and in the presence of Dr. Mally Edmondson.    10/30/17  4:06 PM  JAQUELINE Lucero, Dr. Mally Edmondson, personally performed the services described in this documentation as scribed by   Zev Underwood Les Aly in my presence and it is both accurate and complete

## 2017-11-20 RX ORDER — TERBINAFINE HYDROCHLORIDE 250 MG/1
250 TABLET ORAL DAILY
Qty: 90 TABLET | Refills: 0 | Status: SHIPPED | OUTPATIENT
Start: 2017-11-20 | End: 2018-01-24 | Stop reason: CLARIF

## 2017-11-28 ENCOUNTER — OFFICE VISIT (OUTPATIENT)
Dept: PAIN MANAGEMENT | Age: 82
End: 2017-11-28

## 2017-11-28 VITALS — BODY MASS INDEX: 20.53 KG/M2 | WEIGHT: 115.9 LBS | SYSTOLIC BLOOD PRESSURE: 128 MMHG | DIASTOLIC BLOOD PRESSURE: 88 MMHG

## 2017-11-28 DIAGNOSIS — G89.4 CHRONIC PAIN SYNDROME: ICD-10-CM

## 2017-11-28 DIAGNOSIS — M54.12 CERVICAL RADICULITIS: ICD-10-CM

## 2017-11-28 DIAGNOSIS — K59.03 DRUG-INDUCED CONSTIPATION: ICD-10-CM

## 2017-11-28 DIAGNOSIS — M96.1 POST-LAMINECTOMY SYNDROME: ICD-10-CM

## 2017-11-28 DIAGNOSIS — M47.816 LUMBAR SPONDYLOSIS: ICD-10-CM

## 2017-11-28 DIAGNOSIS — F51.01 PRIMARY INSOMNIA: ICD-10-CM

## 2017-11-28 DIAGNOSIS — I77.9 PERIPHERAL ARTERIAL OCCLUSIVE DISEASE (HCC): ICD-10-CM

## 2017-11-28 DIAGNOSIS — M48.02 SPINAL STENOSIS OF CERVICAL REGION: ICD-10-CM

## 2017-11-28 DIAGNOSIS — M48.062 SPINAL STENOSIS OF LUMBAR REGION WITH NEUROGENIC CLAUDICATION: ICD-10-CM

## 2017-11-28 DIAGNOSIS — F32.A MOOD DISORDER OF DEPRESSED TYPE: ICD-10-CM

## 2017-11-28 DIAGNOSIS — R25.1 TREMORS OF NERVOUS SYSTEM: ICD-10-CM

## 2017-11-28 DIAGNOSIS — M50.30 DDD (DEGENERATIVE DISC DISEASE), CERVICAL: ICD-10-CM

## 2017-11-28 PROCEDURE — 1123F ACP DISCUSS/DSCN MKR DOCD: CPT | Performed by: INTERNAL MEDICINE

## 2017-11-28 PROCEDURE — G8598 ASA/ANTIPLAT THER USED: HCPCS | Performed by: INTERNAL MEDICINE

## 2017-11-28 PROCEDURE — G8427 DOCREV CUR MEDS BY ELIG CLIN: HCPCS | Performed by: INTERNAL MEDICINE

## 2017-11-28 PROCEDURE — 1090F PRES/ABSN URINE INCON ASSESS: CPT | Performed by: INTERNAL MEDICINE

## 2017-11-28 PROCEDURE — 99214 OFFICE O/P EST MOD 30 MIN: CPT | Performed by: INTERNAL MEDICINE

## 2017-11-28 PROCEDURE — 1036F TOBACCO NON-USER: CPT | Performed by: INTERNAL MEDICINE

## 2017-11-28 PROCEDURE — G8400 PT W/DXA NO RESULTS DOC: HCPCS | Performed by: INTERNAL MEDICINE

## 2017-11-28 PROCEDURE — G8484 FLU IMMUNIZE NO ADMIN: HCPCS | Performed by: INTERNAL MEDICINE

## 2017-11-28 PROCEDURE — G8420 CALC BMI NORM PARAMETERS: HCPCS | Performed by: INTERNAL MEDICINE

## 2017-11-28 PROCEDURE — 4040F PNEUMOC VAC/ADMIN/RCVD: CPT | Performed by: INTERNAL MEDICINE

## 2017-11-28 RX ORDER — OXYCODONE AND ACETAMINOPHEN 7.5; 325 MG/1; MG/1
1 TABLET ORAL EVERY 6 HOURS PRN
Qty: 112 TABLET | Refills: 0 | Status: SHIPPED | OUTPATIENT
Start: 2017-11-28 | End: 2017-12-26 | Stop reason: SDUPTHER

## 2017-11-28 RX ORDER — DULOXETIN HYDROCHLORIDE 60 MG/1
CAPSULE, DELAYED RELEASE ORAL
Qty: 30 CAPSULE | Refills: 1 | Status: SHIPPED | OUTPATIENT
Start: 2017-11-28 | End: 2017-12-26 | Stop reason: SDUPTHER

## 2017-11-28 RX ORDER — GABAPENTIN 300 MG/1
CAPSULE ORAL
Qty: 120 CAPSULE | Refills: 1 | Status: SHIPPED | OUTPATIENT
Start: 2017-11-28 | End: 2017-12-26 | Stop reason: SDUPTHER

## 2017-11-28 RX ORDER — MELOXICAM 15 MG/1
TABLET ORAL
Qty: 30 TABLET | Refills: 1 | Status: SHIPPED | OUTPATIENT
Start: 2017-11-28 | End: 2017-12-26 | Stop reason: SDUPTHER

## 2017-11-28 RX ORDER — QUETIAPINE FUMARATE 25 MG/1
TABLET, FILM COATED ORAL
Qty: 30 TABLET | Refills: 1 | Status: SHIPPED | OUTPATIENT
Start: 2017-11-28 | End: 2017-12-26 | Stop reason: SDUPTHER

## 2017-11-28 NOTE — PROGRESS NOTES
Amira Herrera  1934  [id number]    HISTORY OF PRESENT ILLNESS:  Ms. Katelyn Marshall is a 80 y.o. female returns for a follow up visit for multiple medical problems. Her current presenting problems are   1. DDD (degenerative disc disease), cervical    2. Cervical radiculitis    3. Spinal stenosis of cervical region    4. Chronic pain syndrome    5. Spinal stenosis of lumbar region with neurogenic claudication    6. Post-laminectomy syndrome    7. Lumbar spondylosis    8. Peripheral arterial occlusive disease ([address]    9. Primary insomnia    10. Mood disorder of depressed type    11. Drug-induced constipation    12. Tremors of nervous system    . As per information/history obtained from the PADT(patient assessment and documentation tool) - She complains of pain in the upper back, mid back and lower back with radiation to the buttocks and knees Right She rates the pain 5/10 and describes it as sharp, aching. Pain is made worse by: walking, standing, bending, lifting. Current treatment regimen has helped relieve about 50% of the pain. She denies side effects from the current pain regimen. Patient reports that since the last follow up visit the physical functioning is better, family/social relationships are better, mood is better and sleep patterns are better, and that the overall functioning is better. Patient denies neurological bowel or bladder. Patient denies misusing/abusing her narcotic pain medications or using any illegal drugs. There are No indicators for possible drug abuse, addiction or diversion problems. Upon obtaining the medical history from Ms. Katelyn Marshall regarding today's office visit for her presenting problems, Patient states her back has been hurting more in the mid and low back. She states increased physical activity increases pain. She says her neck has been hurting also, feels like knives sticking in the neck. S/P fall about 5-6 weeks ago, has been having increased pain since.  Ms. Katelyn Marshall mentions her right arm hurts all of the time. Patient states her sleep is fair. Has fairly normal sleep latency. Averages about 4-6 hours of sleep a night. Denies any signs of sleep apnea. Feels somewhat rested in the morning. Patient's  subjective report of her mood is fair. she describes occasional symptoms of depression, occasional  irritability and some mood swings. Describes her mood as being neutral and reports some pleasure in her daily activities. Reports  fair  appetite, energy and concentration. Able to function well in different aspects of her daily activities. Denies suicidal or homicidal ideation. Denies any complaints of increased tension, does   Worry sometimes and occasional  irritability  she denies any c/o increased anxiety, No c/o panic attacks or symptoms of PTSD. Patient reports she is smoking e cigs still. ALLERGIES/PAST MED/FAM/SOC HISTORY: Ms. Niki Hong allergies, past medical, family and social history were reviewed in the chart and pertinent information also listed below. Social History     Social History    Marital status:      Spouse name: N/A    Number of children: N/A    Years of education: N/A     Occupational History    Not on file. Social History Main Topics    Smoking status: Former Smoker     Packs/day: 1.00     Years: 35.00     Types: Cigarettes     Quit date: 1/17/2012    Smokeless tobacco: Never Used      Comment: using e cigarette    Alcohol use 0.6 oz/week     1 Shots of liquor per week      Comment: 2-3 times a year    Drug use: No    Sexual activity: Not on file     Other Topics Concern    Not on file     Social History Narrative    No narrative on file      Ms. Niki Hong current medications are   Outpatient Medications Prior to Visit   Medication Sig Dispense Refill    terbinafine (LAMISIL) 250 MG tablet TAKE 1 TABLET BY MOUTH DAILY 90 tablet 0    oxyCODONE-acetaminophen (PERCOCET) 7.5-325 MG per tablet Take 1 tablet by mouth every 6 hours as needed for Pain  Max 4 per day. Earliest Fill Date: 10/30/17 120 tablet 0    QUEtiapine (SEROQUEL) 25 MG tablet TAKE 1 TABLET BY MOUTH NIGHTLY 30 tablet 1    DULoxetine (CYMBALTA) 60 MG extended release capsule TAKE 1 CAPSULE BY MOUTH DAILY. 30 capsule 1    gabapentin (NEURONTIN) 300 MG capsule Take one capsule in the morning, one capsule in the afternoon and two capsules at night 120 capsule 1    meloxicam (MOBIC) 15 MG tablet Take one tablet Monday,wednesday and Friday 30 tablet 1    rivaroxaban (XARELTO) 15 MG TABS tablet Take 1 tablet by mouth daily (with breakfast) 90 tablet 3    omeprazole (PRILOSEC) 40 MG delayed release capsule TAKE 1 CAPSULE BY MOUTH DAILY 180 capsule 1    tiotropium (SPIRIVA) 18 MCG inhalation capsule Inhale 1 capsule into the lungs daily 90 capsule 1    metoprolol tartrate (LOPRESSOR) 25 MG tablet Take 1 tablet by mouth 2 times daily 60 tablet 3    lisinopril (PRINIVIL;ZESTRIL) 20 MG tablet Take 1 tablet by mouth daily 30 tablet 3    Blood Pressure KIT Check bp once daily 1 kit 0    fluticasone-salmeterol (ADVAIR DISKUS) 250-50 MCG/DOSE AEPB INHALE 1 PUFF INTO THE LUNGS EVERY 12 HOURS 1 Inhaler 5    albuterol sulfate HFA (PROAIR HFA) 108 (90 BASE) MCG/ACT inhaler INHALE 2 PUFFS INTO THE LUNGS EVERY 4 HOURS AS NEEDED FOR WHEEZING. 1 Inhaler 5    Homeopathic Products (LEG CRAMP RELIEF PO) Take by mouth as needed      ferrous sulfate 325 (65 FE) MG EC tablet Take 325 mg by mouth daily (with breakfast)      Psyllium (METAMUCIL MULTIHEALTH FIBER PO) Take 2 capsules by mouth daily        No facility-administered medications prior to visit. REVIEW OF SYSTEMS:   Constitutional: Negative for fatigue and unexpected weight change. Eyes: Negative for visual disturbance. Respiratory: Negative for shortness of breath.     Cardiovascular: Negative for chest pain, palpitations  Gastrointestinal: Negative for blood in stool, abdominal distention, nausea, vomiting, abdominal pain, diarrhea,constipation. Skin: Negative for color change or any abnormal bruising. Neurological: Negative for speech difficulty, weakness and light-headedness, dizziness, tremors, sleepiness  Psychiatric/Behavioral: Negative for suicidal ideas, hallucinations, behavioral problems, self-injury, decreased concentration/cognition, agitation, confusion. PHYSICAL EXAM:   Nursing note and vitals reviewed. /88 (Site: Left Arm, Position: Sitting)   Wt 115 lb 14.4 oz (52.6 kg)   Breastfeeding? No   BMI 20.53 kg/m²   General Appearance: Patient is well nourished, well developed, well groomed and in no acute distress. Skin: Skin is warm and dry, good turgor . No rash or lesions noted. She is not diaphoretic. Pulmonary/Chest: Effort normal. No respiratory distress or use of accessory muscles. Auscultation revealing decreased air exchange bilaterally. She does not have wheezes in the lung fields. She has no rales. Cardiovascular: Normal rate, regular rhythm, normal heart sounds, and does not have murmur. Exam reveals no gallop and no friction rub. Abdominal: Soft, bowel sounds are normal.  On inspection of abdomen is flat no distension and no mass. Musculoskeletal / Extremities: Range of motion is normal. Gait is slow, assistive devices use: none. +stooped posture   She exhibits edema: none, and no tenderness. Neurological/Psychiatric:She is alert and oriented to person, place, and time. Coordination is  normal.   Judgement and Insight is normal  Her mood is Appropriate and affect is Anxious . Her behavior is normal.   Thought content normal.        IMPRESSION:     1. DDD (degenerative disc disease), cervical  - WORSENING: treatment plan changed as below   2. Cervical radiculitis  - WORSENING: treatment plan changed as below   3. Spinal stenosis of cervical region  - WORSENING: treatment plan changed as below   4. Chronic pain syndrome - INADEQUATELY CONTROLLED: treatment plan adjusted as below   5. disease), cervical  -     meloxicam (MOBIC) 15 MG tablet; Take one tablet Monday,wednesday and Friday  -     oxyCODONE-acetaminophen (PERCOCET) 7.5-325 MG per tablet; Take 1 tablet by mouth every 6 hours as needed for Pain  Max 4 per day. Cervical radiculitis  -     gabapentin (NEURONTIN) 300 MG capsule; Take one capsule in the morning, one capsule in the afternoon and two capsules at night    Spinal stenosis of cervical region  -     gabapentin (NEURONTIN) 300 MG capsule; Take one capsule in the morning, one capsule in the afternoon and two capsules at night    Chronic pain syndrome  -     QUEtiapine (SEROQUEL) 25 MG tablet; TAKE 1 TABLET BY MOUTH NIGHTLY  -     DULoxetine (CYMBALTA) 60 MG extended release capsule; TAKE 1 CAPSULE BY MOUTH DAILY. -     gabapentin (NEURONTIN) 300 MG capsule; Take one capsule in the morning, one capsule in the afternoon and two capsules at night  -     meloxicam (MOBIC) 15 MG tablet; Take one tablet Monday,wednesday and Friday  -     oxyCODONE-acetaminophen (PERCOCET) 7.5-325 MG per tablet; Take 1 tablet by mouth every 6 hours as needed for Pain  Max 4 per day. Spinal stenosis of lumbar region with neurogenic claudication  -     gabapentin (NEURONTIN) 300 MG capsule; Take one capsule in the morning, one capsule in the afternoon and two capsules at night  -     meloxicam (MOBIC) 15 MG tablet; Take one tablet Monday,wednesday and Friday  -     oxyCODONE-acetaminophen (PERCOCET) 7.5-325 MG per tablet; Take 1 tablet by mouth every 6 hours as needed for Pain  Max 4 per day. Post-laminectomy syndrome  -     gabapentin (NEURONTIN) 300 MG capsule; Take one capsule in the morning, one capsule in the afternoon and two capsules at night  -     meloxicam (MOBIC) 15 MG tablet; Take one tablet Monday,wednesday and Friday  -     oxyCODONE-acetaminophen (PERCOCET) 7.5-325 MG per tablet; Take 1 tablet by mouth every 6 hours as needed for Pain  Max 4 per day.     Lumbar spondylosis  - gabapentin (NEURONTIN) 300 MG capsule; Take one capsule in the morning, one capsule in the afternoon and two capsules at night  -     oxyCODONE-acetaminophen (PERCOCET) 7.5-325 MG per tablet; Take 1 tablet by mouth every 6 hours as needed for Pain  Max 4 per day. Peripheral arterial occlusive disease [address] [address]  -     oxyCODONE-acetaminophen (PERCOCET) 7.5-325 MG per tablet; Take 1 tablet by mouth every 6 hours as needed for Pain  Max 4 per day. Primary insomnia  -     QUEtiapine (SEROQUEL) 25 MG tablet; TAKE 1 TABLET BY MOUTH NIGHTLY    Mood disorder of depressed type  -     DULoxetine (CYMBALTA) 60 MG extended release capsule; TAKE 1 CAPSULE BY MOUTH DAILY. Drug-induced constipation    Tremors of nervous system  -     gabapentin (NEURONTIN) 300 MG capsule; Take one capsule in the morning, one capsule in the afternoon and two capsules at night  -     oxyCODONE-acetaminophen (PERCOCET) 7.5-325 MG per tablet; Take 1 tablet by mouth every 6 hours as needed for Pain  Max 4 per day. Goals of current treatment regimen include improvement in pain, restoration of functioning- with focus on improvement in physical performance, general activity, work or disability,emotional distress, health care utilization and  decreased medication consumption. Will continue to monitor progress towards achieving/maintaining therapeutic goals with special emphasis on  1. Improvement in perceived interfernce  of pain with ADL's. Ability to do home exercises independently. Ability to do household chores indoor and/or outdoor work and social and leisure activities. To increase flexibility/ROM, strength and endurance. Improve psychosocial and physical functioning.- she is not showing any significant progress/or showing regression  towards this goal and reassessment and adjustment of goals/treatment have been made. 2. Improving sleep to 6-7 hours a night.  Improve mood/ anxiety and depression symptoms such as crying spells, low energy,

## 2017-11-29 ENCOUNTER — TELEPHONE (OUTPATIENT)
Dept: PAIN MANAGEMENT | Age: 82
End: 2017-11-29

## 2017-11-29 RX ORDER — METHYLPREDNISOLONE 4 MG/1
4 TABLET ORAL SEE ADMIN INSTRUCTIONS
Qty: 1 KIT | Refills: 0 | Status: SHIPPED | OUTPATIENT
Start: 2017-11-29 | End: 2017-12-05

## 2017-11-29 RX ORDER — METHOCARBAMOL 500 MG/1
500 TABLET, FILM COATED ORAL 2 TIMES DAILY
Qty: 28 TABLET | Refills: 0 | Status: SHIPPED | OUTPATIENT
Start: 2017-11-29 | End: 2017-12-13

## 2017-12-08 ENCOUNTER — TELEPHONE (OUTPATIENT)
Dept: CARDIOLOGY CLINIC | Age: 82
End: 2017-12-08

## 2017-12-08 RX ORDER — LISINOPRIL 20 MG/1
TABLET ORAL
Qty: 30 TABLET | Refills: 3 | OUTPATIENT
Start: 2017-12-08

## 2017-12-08 RX ORDER — LISINOPRIL 20 MG/1
20 TABLET ORAL DAILY
Qty: 30 TABLET | Refills: 3 | Status: SHIPPED | OUTPATIENT
Start: 2017-12-08 | End: 2018-03-02 | Stop reason: SDUPTHER

## 2017-12-26 ENCOUNTER — OFFICE VISIT (OUTPATIENT)
Dept: PAIN MANAGEMENT | Age: 82
End: 2017-12-26

## 2017-12-26 VITALS
DIASTOLIC BLOOD PRESSURE: 58 MMHG | HEART RATE: 61 BPM | SYSTOLIC BLOOD PRESSURE: 110 MMHG | BODY MASS INDEX: 20.37 KG/M2 | WEIGHT: 115 LBS

## 2017-12-26 DIAGNOSIS — M48.062 SPINAL STENOSIS OF LUMBAR REGION WITH NEUROGENIC CLAUDICATION: ICD-10-CM

## 2017-12-26 DIAGNOSIS — I77.9 PERIPHERAL ARTERIAL OCCLUSIVE DISEASE (HCC): ICD-10-CM

## 2017-12-26 DIAGNOSIS — F51.01 PRIMARY INSOMNIA: ICD-10-CM

## 2017-12-26 DIAGNOSIS — M50.30 DDD (DEGENERATIVE DISC DISEASE), CERVICAL: ICD-10-CM

## 2017-12-26 DIAGNOSIS — G89.4 CHRONIC PAIN SYNDROME: ICD-10-CM

## 2017-12-26 DIAGNOSIS — M96.1 POST-LAMINECTOMY SYNDROME: ICD-10-CM

## 2017-12-26 DIAGNOSIS — M54.12 CERVICAL RADICULITIS: ICD-10-CM

## 2017-12-26 DIAGNOSIS — M48.02 SPINAL STENOSIS OF CERVICAL REGION: ICD-10-CM

## 2017-12-26 DIAGNOSIS — F32.A MOOD DISORDER OF DEPRESSED TYPE: ICD-10-CM

## 2017-12-26 DIAGNOSIS — R25.1 TREMORS OF NERVOUS SYSTEM: ICD-10-CM

## 2017-12-26 DIAGNOSIS — M47.816 LUMBAR SPONDYLOSIS: ICD-10-CM

## 2017-12-26 PROCEDURE — G8427 DOCREV CUR MEDS BY ELIG CLIN: HCPCS | Performed by: INTERNAL MEDICINE

## 2017-12-26 PROCEDURE — G8420 CALC BMI NORM PARAMETERS: HCPCS | Performed by: INTERNAL MEDICINE

## 2017-12-26 PROCEDURE — 4040F PNEUMOC VAC/ADMIN/RCVD: CPT | Performed by: INTERNAL MEDICINE

## 2017-12-26 PROCEDURE — G8400 PT W/DXA NO RESULTS DOC: HCPCS | Performed by: INTERNAL MEDICINE

## 2017-12-26 PROCEDURE — G8484 FLU IMMUNIZE NO ADMIN: HCPCS | Performed by: INTERNAL MEDICINE

## 2017-12-26 PROCEDURE — G8598 ASA/ANTIPLAT THER USED: HCPCS | Performed by: INTERNAL MEDICINE

## 2017-12-26 PROCEDURE — 1036F TOBACCO NON-USER: CPT | Performed by: INTERNAL MEDICINE

## 2017-12-26 PROCEDURE — 1123F ACP DISCUSS/DSCN MKR DOCD: CPT | Performed by: INTERNAL MEDICINE

## 2017-12-26 PROCEDURE — 99213 OFFICE O/P EST LOW 20 MIN: CPT | Performed by: INTERNAL MEDICINE

## 2017-12-26 PROCEDURE — 1090F PRES/ABSN URINE INCON ASSESS: CPT | Performed by: INTERNAL MEDICINE

## 2017-12-26 RX ORDER — OXYCODONE AND ACETAMINOPHEN 7.5; 325 MG/1; MG/1
1 TABLET ORAL EVERY 6 HOURS PRN
Qty: 112 TABLET | Refills: 0 | Status: ON HOLD | OUTPATIENT
Start: 2017-12-26 | End: 2018-01-09 | Stop reason: HOSPADM

## 2017-12-26 RX ORDER — QUETIAPINE FUMARATE 25 MG/1
TABLET, FILM COATED ORAL
Qty: 30 TABLET | Refills: 1 | Status: SHIPPED | OUTPATIENT
Start: 2017-12-26 | End: 2018-02-23

## 2017-12-26 RX ORDER — DULOXETIN HYDROCHLORIDE 60 MG/1
CAPSULE, DELAYED RELEASE ORAL
Qty: 30 CAPSULE | Refills: 1 | Status: ON HOLD | OUTPATIENT
Start: 2017-12-26 | End: 2018-01-26 | Stop reason: HOSPADM

## 2017-12-26 RX ORDER — MELOXICAM 15 MG/1
TABLET ORAL
Qty: 30 TABLET | Refills: 1 | Status: ON HOLD | OUTPATIENT
Start: 2017-12-26 | End: 2018-01-26 | Stop reason: HOSPADM

## 2017-12-26 RX ORDER — GABAPENTIN 300 MG/1
CAPSULE ORAL
Qty: 120 CAPSULE | Refills: 1 | Status: ON HOLD | OUTPATIENT
Start: 2017-12-26 | End: 2018-01-09 | Stop reason: HOSPADM

## 2017-12-26 NOTE — PROGRESS NOTES
 albuterol sulfate HFA (PROAIR HFA) 108 (90 BASE) MCG/ACT inhaler INHALE 2 PUFFS INTO THE LUNGS EVERY 4 HOURS AS NEEDED FOR WHEEZING. 1 Inhaler 5    Homeopathic Products (LEG CRAMP RELIEF PO) Take by mouth as needed      ferrous sulfate 325 (65 FE) MG EC tablet Take 325 mg by mouth daily (with breakfast)      Psyllium (METAMUCIL MULTIHEALTH FIBER PO) Take 2 capsules by mouth daily        No current facility-administered medications for this visit. I will continue her current medication regimen  which is part of the above treatment schedule. It has been helping with Ms. Marsh's chronic  medical problems which for this visit include:   Diagnoses of Chronic pain syndrome, Spinal stenosis of lumbar region with neurogenic claudication, Post-laminectomy syndrome, Lumbar spondylosis, DDD (degenerative disc disease), cervical, Peripheral arterial occlusive disease ([address] Cervical radiculitis, and Spinal stenosis of cervical region were pertinent to this visit. Risks and benefits of the medications and other alternative treatments  including no treatment were discussed with the patient. The common side effects of these medications were also explained to the patient. Informed verbal consent was obtained. Goals of current treatment regimen include improvement in pain, restoration of functioning- with focus on improvement in physical performance, general activity, work or disability,emotional distress, health care utilization and  decreased medication consumption. Will continue to monitor progress towards achieving/maintaining therapeutic goals with special emphasis on  1. Improvement in perceived interfernce  of pain with ADL's. Ability to do home exercises independently. Ability to do household chores indoor and/or outdoor work and social and leisure activities. Improve psychosocial and physical functioning. - she is showing progression towards this treatment goal with the current regimen.        She was

## 2018-01-01 PROBLEM — I48.91 A-FIB (HCC): Status: ACTIVE | Noted: 2018-01-01

## 2018-01-04 PROBLEM — I48.20 CHRONIC ATRIAL FIBRILLATION (HCC): Status: ACTIVE | Noted: 2018-01-01

## 2018-01-16 ENCOUNTER — TELEPHONE (OUTPATIENT)
Dept: INTERNAL MEDICINE CLINIC | Age: 83
End: 2018-01-16

## 2018-01-16 NOTE — TELEPHONE ENCOUNTER
DES FROM [facility] CALLING TO ASK WHEN PT RECEIVED PNEUMONIA SHOT. READ HER THE DATE IN THE CHART OF 10-28-09.   [address]

## 2018-01-24 PROBLEM — J16.0 CAP (COMMUNITY ACQUIRED PNEUMONIA) DUE TO CHLAMYDIA SPECIES: Status: ACTIVE | Noted: 2018-01-24

## 2018-02-19 ENCOUNTER — TELEPHONE (OUTPATIENT)
Dept: PAIN MANAGEMENT | Age: 83
End: 2018-02-19

## 2018-02-23 ENCOUNTER — OFFICE VISIT (OUTPATIENT)
Dept: PAIN MANAGEMENT | Age: 83
End: 2018-02-23

## 2018-02-23 VITALS — DIASTOLIC BLOOD PRESSURE: 98 MMHG | HEART RATE: 80 BPM | SYSTOLIC BLOOD PRESSURE: 123 MMHG

## 2018-02-23 DIAGNOSIS — F51.01 PRIMARY INSOMNIA: ICD-10-CM

## 2018-02-23 DIAGNOSIS — M54.12 CERVICAL RADICULITIS: ICD-10-CM

## 2018-02-23 DIAGNOSIS — M50.30 DDD (DEGENERATIVE DISC DISEASE), CERVICAL: ICD-10-CM

## 2018-02-23 DIAGNOSIS — M48.02 SPINAL STENOSIS OF CERVICAL REGION: ICD-10-CM

## 2018-02-23 DIAGNOSIS — K59.03 DRUG-INDUCED CONSTIPATION: ICD-10-CM

## 2018-02-23 DIAGNOSIS — M47.816 LUMBAR SPONDYLOSIS: ICD-10-CM

## 2018-02-23 DIAGNOSIS — G89.4 CHRONIC PAIN SYNDROME: ICD-10-CM

## 2018-02-23 PROCEDURE — 99213 OFFICE O/P EST LOW 20 MIN: CPT | Performed by: INTERNAL MEDICINE

## 2018-02-23 PROCEDURE — G8427 DOCREV CUR MEDS BY ELIG CLIN: HCPCS | Performed by: INTERNAL MEDICINE

## 2018-02-23 PROCEDURE — G8484 FLU IMMUNIZE NO ADMIN: HCPCS | Performed by: INTERNAL MEDICINE

## 2018-02-23 PROCEDURE — G8420 CALC BMI NORM PARAMETERS: HCPCS | Performed by: INTERNAL MEDICINE

## 2018-02-23 PROCEDURE — G8598 ASA/ANTIPLAT THER USED: HCPCS | Performed by: INTERNAL MEDICINE

## 2018-02-23 PROCEDURE — 4040F PNEUMOC VAC/ADMIN/RCVD: CPT | Performed by: INTERNAL MEDICINE

## 2018-02-23 PROCEDURE — 1090F PRES/ABSN URINE INCON ASSESS: CPT | Performed by: INTERNAL MEDICINE

## 2018-02-23 PROCEDURE — 1123F ACP DISCUSS/DSCN MKR DOCD: CPT | Performed by: INTERNAL MEDICINE

## 2018-02-23 PROCEDURE — G8400 PT W/DXA NO RESULTS DOC: HCPCS | Performed by: INTERNAL MEDICINE

## 2018-02-23 PROCEDURE — 1111F DSCHRG MED/CURRENT MED MERGE: CPT | Performed by: INTERNAL MEDICINE

## 2018-02-23 PROCEDURE — 1036F TOBACCO NON-USER: CPT | Performed by: INTERNAL MEDICINE

## 2018-02-23 RX ORDER — OXYCODONE HYDROCHLORIDE AND ACETAMINOPHEN 5; 325 MG/1; MG/1
1 TABLET ORAL EVERY 6 HOURS PRN
Qty: 112 TABLET | Refills: 0 | Status: SHIPPED | OUTPATIENT
Start: 2018-02-23 | End: 2018-03-23 | Stop reason: SDUPTHER

## 2018-02-23 RX ORDER — GABAPENTIN 300 MG/1
300 CAPSULE ORAL DAILY
Qty: 30 CAPSULE | Refills: 0 | Status: SHIPPED | OUTPATIENT
Start: 2018-02-23 | End: 2018-03-23 | Stop reason: SDUPTHER

## 2018-02-23 RX ORDER — POTASSIUM CHLORIDE 750 MG/1
10 TABLET, EXTENDED RELEASE ORAL 2 TIMES DAILY
COMMUNITY
End: 2018-03-02 | Stop reason: SDUPTHER

## 2018-02-23 NOTE — PROGRESS NOTES
Gary Dover  1934  [id number]    HISTORY OF PRESENT ILLNESS:  Ms. Misbah Mercer is a 80 y.o. female returns for a follow up visit for multiple medical problems. Her current presenting problems are   1. Chronic pain syndrome    2. Spinal stenosis of cervical region    3. Lumbar spondylosis    4. Drug-induced constipation    5. DDD (degenerative disc disease), cervical    6. Cervical radiculitis    . As per information/history obtained from the PADT(patient assessment and documentation tool) - She complains of pain in the neck, upper back, mid back and lower back with radiation to the shoulders Bilateral, arms Bilateral, elbows Bilateral, hands Bilateral, buttocks, hips Bilateral, upper leg Bilateral, knees Bilateral, lower leg Bilateral, ankles Bilateral and feet Bilateral She rates the pain 9/10 and describes it as aching, burning, numbness, pins and needles. Pain is made worse by: movement, walking, standing, sitting, bending, lifting. Current treatment regimen has helped relieve about 10% of the pain. She denies side effects from the current pain regimen. Patient reports that since the last follow up visit the physical functioning is worse, family/social relationships are unchanged, mood is unchanged and sleep patterns are unchanged, and that the overall functioning is unchanged. Patient denies neurological bowel or bladder. Patient denies misusing/abusing her narcotic pain medications or using any illegal drugs. There are No indicators for possible drug abuse, addiction or diversion problems. Upon obtaining the medical history from Ms. Misbah Mercer regarding today's office visit for her presenting problems, Patient reports she has been in the hospital for cardiac issues and then rehab. She complains she had a pneumonia. She mentions for almost two months she has been going in and out of rehab. She says she had lost some weight. She reports she is doing PT at home.   She complains her back and neck symptoms or if the symptoms worsen, the patient should call the office. While transcribing every attempt was made to maintain the accuracy of the note in terms of it's contents,there may have been some errors made inadvertently. Thank you for allowing me to participate in the care of this patient. Irene Boyce MD.    Cc: No primary care provider on file.     Ileana Guillermo, am scribing for and in the presence of Dr. Irene Boyce.   02/23/18  4:19 PM  Yumiko Boykin MA   I, Dr. Irene Boyce, personally performed the services described in this documentation as scribed by   Yumiko Boykin MA in my presence and it is both accurate and complete

## 2018-03-02 ENCOUNTER — OFFICE VISIT (OUTPATIENT)
Dept: CARDIOLOGY CLINIC | Age: 83
End: 2018-03-02

## 2018-03-02 VITALS
DIASTOLIC BLOOD PRESSURE: 54 MMHG | WEIGHT: 100 LBS | BODY MASS INDEX: 17.71 KG/M2 | HEART RATE: 60 BPM | SYSTOLIC BLOOD PRESSURE: 108 MMHG

## 2018-03-02 DIAGNOSIS — I48.0 PAROXYSMAL ATRIAL FIBRILLATION (HCC): ICD-10-CM

## 2018-03-02 DIAGNOSIS — I10 ESSENTIAL HYPERTENSION: ICD-10-CM

## 2018-03-02 DIAGNOSIS — I50.32 CHRONIC DIASTOLIC CONGESTIVE HEART FAILURE (HCC): Primary | ICD-10-CM

## 2018-03-02 PROCEDURE — G8400 PT W/DXA NO RESULTS DOC: HCPCS | Performed by: NURSE PRACTITIONER

## 2018-03-02 PROCEDURE — G8427 DOCREV CUR MEDS BY ELIG CLIN: HCPCS | Performed by: NURSE PRACTITIONER

## 2018-03-02 PROCEDURE — 1123F ACP DISCUSS/DSCN MKR DOCD: CPT | Performed by: NURSE PRACTITIONER

## 2018-03-02 PROCEDURE — 99214 OFFICE O/P EST MOD 30 MIN: CPT | Performed by: NURSE PRACTITIONER

## 2018-03-02 PROCEDURE — G8484 FLU IMMUNIZE NO ADMIN: HCPCS | Performed by: NURSE PRACTITIONER

## 2018-03-02 PROCEDURE — 1090F PRES/ABSN URINE INCON ASSESS: CPT | Performed by: NURSE PRACTITIONER

## 2018-03-02 PROCEDURE — G8419 CALC BMI OUT NRM PARAM NOF/U: HCPCS | Performed by: NURSE PRACTITIONER

## 2018-03-02 PROCEDURE — 1036F TOBACCO NON-USER: CPT | Performed by: NURSE PRACTITIONER

## 2018-03-02 PROCEDURE — G8598 ASA/ANTIPLAT THER USED: HCPCS | Performed by: NURSE PRACTITIONER

## 2018-03-02 PROCEDURE — 4040F PNEUMOC VAC/ADMIN/RCVD: CPT | Performed by: NURSE PRACTITIONER

## 2018-03-02 RX ORDER — POTASSIUM CHLORIDE 750 MG/1
10 TABLET, EXTENDED RELEASE ORAL 2 TIMES DAILY
Qty: 180 TABLET | Refills: 3 | Status: SHIPPED | OUTPATIENT
Start: 2018-03-02 | End: 2019-03-11 | Stop reason: SDUPTHER

## 2018-03-02 RX ORDER — LISINOPRIL 10 MG/1
10 TABLET ORAL DAILY
Qty: 90 TABLET | Refills: 3 | Status: SHIPPED | OUTPATIENT
Start: 2018-03-02 | End: 2019-01-01

## 2018-03-02 RX ORDER — METOPROLOL SUCCINATE 200 MG/1
200 TABLET, EXTENDED RELEASE ORAL DAILY
Qty: 90 TABLET | Refills: 3 | Status: SHIPPED | OUTPATIENT
Start: 2018-03-02 | End: 2018-06-08 | Stop reason: ALTCHOICE

## 2018-03-02 NOTE — PROGRESS NOTES
CC/HPI:  80 y.o. patient of Dr. Jose Lagunas with AAA, paroxysmal A fib, HTN and dCHF who is here for a hospital follow up. She was hospitalized and treated for fever, pneumonia and a flutter. She has been released from SNF and is back home. She denies cp, sob, LH/dizziness, palpitations, orthopnea, LE edema or syncope. No GI/ bleeding. Her biggest complaint is neck pain. Tolerating mediations. She hasn't had any falls. Past Medical History:   Diagnosis Date    AAA (abdominal aortic aneurysm) (HCC)     Arthritis     Atrial fibrillation (HCC)     Paroxysmal artial fib during hospitalization for sepsis; seen by Dr. Jose Lagunas, later had stress test; anticoagulated with Plavix    Atypical chest pain 7/30/2013    Carpal tunnel syndrome of left wrist     Chronic pain syndrome     COPD (chronic obstructive pulmonary disease) ([address]     Duodenal ulcer 5/10/2012    History of atrial fibrillation 11/12/2015    Hypertension     Insomnia     Lumbar spondylosis     Lumbar spondylosis     Mucopurulent chronic bronchitis ([address]     Peripheral arterial occlusive disease ([address]     Post-laminectomy syndrome     Pre-syncope 1/10/2013    1/13 Admitted to [facility] May well have been a seizure. Family observed 3-5 min of shaking of extremities.  Renal artery stenosis ([address] 10/09    [facility] records    Renal artery stenosis ([address]     SBO (small bowel obstruction)     Seizure ([address] 1/9/2013    Spinal stenosis      Past Surgical History:   Procedure Laterality Date    ABDOMEN SURGERY N/A 03/17/2016    LAPAROSCOPIC CHOLECYSTECTOMY WITH INTRAOPERATIVE    BACK SURGERY      COLONOSCOPY  1/10    Dr. Joby Desai; 2 small polyps, adenomas    HYSTERECTOMY      OTHER SURGICAL HISTORY      excision of tender calcified mass    OTHER SURGICAL HISTORY      blood clots     OTHER SURGICAL HISTORY  02/14/11    right femoral popliteal bypass     No family history on file.   Social History   Substance Use Topics    Smoking

## 2018-03-23 ENCOUNTER — OFFICE VISIT (OUTPATIENT)
Dept: PAIN MANAGEMENT | Age: 83
End: 2018-03-23

## 2018-03-23 VITALS
SYSTOLIC BLOOD PRESSURE: 112 MMHG | DIASTOLIC BLOOD PRESSURE: 56 MMHG | HEART RATE: 52 BPM | WEIGHT: 100 LBS | BODY MASS INDEX: 17.71 KG/M2

## 2018-03-23 DIAGNOSIS — M47.816 LUMBAR SPONDYLOSIS: ICD-10-CM

## 2018-03-23 DIAGNOSIS — M48.02 SPINAL STENOSIS OF CERVICAL REGION: ICD-10-CM

## 2018-03-23 DIAGNOSIS — M54.12 CERVICAL RADICULITIS: ICD-10-CM

## 2018-03-23 DIAGNOSIS — M96.1 POST-LAMINECTOMY SYNDROME: ICD-10-CM

## 2018-03-23 DIAGNOSIS — G89.4 CHRONIC PAIN SYNDROME: ICD-10-CM

## 2018-03-23 DIAGNOSIS — M50.30 DDD (DEGENERATIVE DISC DISEASE), CERVICAL: ICD-10-CM

## 2018-03-23 PROCEDURE — G8482 FLU IMMUNIZE ORDER/ADMIN: HCPCS | Performed by: INTERNAL MEDICINE

## 2018-03-23 PROCEDURE — G8419 CALC BMI OUT NRM PARAM NOF/U: HCPCS | Performed by: INTERNAL MEDICINE

## 2018-03-23 PROCEDURE — 99213 OFFICE O/P EST LOW 20 MIN: CPT | Performed by: INTERNAL MEDICINE

## 2018-03-23 PROCEDURE — 4040F PNEUMOC VAC/ADMIN/RCVD: CPT | Performed by: INTERNAL MEDICINE

## 2018-03-23 PROCEDURE — G8400 PT W/DXA NO RESULTS DOC: HCPCS | Performed by: INTERNAL MEDICINE

## 2018-03-23 PROCEDURE — 1036F TOBACCO NON-USER: CPT | Performed by: INTERNAL MEDICINE

## 2018-03-23 PROCEDURE — G8598 ASA/ANTIPLAT THER USED: HCPCS | Performed by: INTERNAL MEDICINE

## 2018-03-23 PROCEDURE — 1090F PRES/ABSN URINE INCON ASSESS: CPT | Performed by: INTERNAL MEDICINE

## 2018-03-23 PROCEDURE — 1123F ACP DISCUSS/DSCN MKR DOCD: CPT | Performed by: INTERNAL MEDICINE

## 2018-03-23 PROCEDURE — G8427 DOCREV CUR MEDS BY ELIG CLIN: HCPCS | Performed by: INTERNAL MEDICINE

## 2018-03-23 RX ORDER — GABAPENTIN 300 MG/1
300 CAPSULE ORAL 2 TIMES DAILY
Qty: 60 CAPSULE | Refills: 0 | Status: SHIPPED | OUTPATIENT
Start: 2018-03-23 | End: 2018-04-17 | Stop reason: SDUPTHER

## 2018-03-23 RX ORDER — OXYCODONE HYDROCHLORIDE AND ACETAMINOPHEN 5; 325 MG/1; MG/1
1 TABLET ORAL EVERY 6 HOURS PRN
Qty: 112 TABLET | Refills: 0 | Status: SHIPPED | OUTPATIENT
Start: 2018-03-23 | End: 2018-04-17 | Stop reason: ALTCHOICE

## 2018-03-23 NOTE — PROGRESS NOTES
Nohemy West  1934  [id number]    HISTORY OF PRESENT ILLNESS:  Ms. Jason Blanco is a 80 y.o. female returns for a follow up visit for multiple medical problems. Her current presenting problems are   1. Chronic pain syndrome    2. Post-laminectomy syndrome    3. Spinal stenosis of cervical region    4. Lumbar spondylosis    5. DDD (degenerative disc disease), cervical    6. Cervical radiculitis    . As per information/history obtained from the PADT(patient assessment and documentation tool) - She complains of pain in the neck, upper back, mid back, lower back and knees Right with radiation to the elbows Right, buttocks and feet Right She rates the pain 4/10 and describes it as aching, throbbing. Pain is made worse by: walking, standing, bending, lifting. Current treatment regimen has helped relieve about 30% of the pain. She denies side effects from the current pain regimen. Patient reports that since the last follow up visit the physical functioning is better, family/social relationships are better, mood is better and sleep patterns are better, and that the overall functioning is better. Patient denies neurological bowel or bladder. Patient denies misusing/abusing her narcotic pain medications or using any illegal drugs. There are No indicators for possible drug abuse, addiction or diversion problems. Iftikhar Zabala Upon obtaining the medical history from Ms. Jason Blnaco regarding today's office visit for her presenting problems, per son doing better than last month. Ms. Jason Blanco says her breathing is better. She says she feels the Percocet 5 mg in not helping with the pain as well. She states her neck and back hurts the most. She was seen with her son today. She says she is walking some by using a cane or by herself. ALLERGIES: Patients list of allergies were reviewed     MEDICATIONS: Ms. Jason Blanco list of medications were reviewed. Her current medications are   Outpatient Medications Prior to Visit   Medication LUNGS EVERY 12 HOURS 1 Inhaler 5    albuterol sulfate HFA (PROAIR HFA) 108 (90 BASE) MCG/ACT inhaler INHALE 2 PUFFS INTO THE LUNGS EVERY 4 HOURS AS NEEDED FOR WHEEZING. 1 Inhaler 5     No current facility-administered medications for this visit. I will continue her current medication regimen  which is part of the above treatment schedule. It has been helping with Ms. Marsh's chronic  medical problems which for this visit include:   Diagnoses of Chronic pain syndrome, Post-laminectomy syndrome, Spinal stenosis of cervical region, Lumbar spondylosis, DDD (degenerative disc disease), cervical, and Cervical radiculitis were pertinent to this visit. Risks and benefits of the medications and other alternative treatments  including no treatment were discussed with the patient. The common side effects of these medications were also explained to the patient. Informed verbal consent was obtained. Goals of current treatment regimen include improvement in pain, restoration of functioning- with focus on improvement in physical performance, general activity, work or disability,emotional distress, health care utilization and  decreased medication consumption. Will continue to monitor progress towards achieving/maintaining therapeutic goals with special emphasis on  1. Improvement in perceived interfernce  of pain with ADL's. Ability to do home exercises independently. Ability to do household chores indoor and/or outdoor work and social and leisure activities. Improve psychosocial and physical functioning. - she is showing progression towards this treatment goal with the current regimen. She was advised against drinking alcohol with the narcotic pain medicines, advised against driving or handling machinery while adjusting the dose of medicines or if having cognitive  issues related to the current medications. Risk of overdose and death, if medicines not taken as prescribed, were also discussed.  If the patient develops new symptoms or if the symptoms worsen, the patient should call the office. While transcribing every attempt was made to maintain the accuracy of the note in terms of it's contents,there may have been some errors made inadvertently. Thank you for allowing me to participate in the care of this patient. Marlene Gregg MD.    Cc:  primary care provider on file. Nichelle Ford, scribing for in the presence  of Dr. Marlene Gregg.   03/23/18  3:06 PM  Vignesh Macias Assistant  I, Dr. Marlene Gregg, personally performed the services described in this documentation as scribed by  Milagros Willams MA in my presence and it is both accurate and complete

## 2018-04-17 ENCOUNTER — OFFICE VISIT (OUTPATIENT)
Dept: PAIN MANAGEMENT | Age: 83
End: 2018-04-17

## 2018-04-17 VITALS
BODY MASS INDEX: 19.2 KG/M2 | DIASTOLIC BLOOD PRESSURE: 90 MMHG | HEART RATE: 54 BPM | WEIGHT: 108.4 LBS | SYSTOLIC BLOOD PRESSURE: 133 MMHG

## 2018-04-17 DIAGNOSIS — M96.1 POST-LAMINECTOMY SYNDROME: ICD-10-CM

## 2018-04-17 DIAGNOSIS — M50.30 DDD (DEGENERATIVE DISC DISEASE), CERVICAL: ICD-10-CM

## 2018-04-17 DIAGNOSIS — G89.4 CHRONIC PAIN SYNDROME: ICD-10-CM

## 2018-04-17 DIAGNOSIS — F51.01 PRIMARY INSOMNIA: ICD-10-CM

## 2018-04-17 DIAGNOSIS — M47.816 LUMBAR SPONDYLOSIS: ICD-10-CM

## 2018-04-17 DIAGNOSIS — M48.02 SPINAL STENOSIS OF CERVICAL REGION: ICD-10-CM

## 2018-04-17 DIAGNOSIS — F32.A MOOD DISORDER OF DEPRESSED TYPE: ICD-10-CM

## 2018-04-17 DIAGNOSIS — K59.03 DRUG-INDUCED CONSTIPATION: ICD-10-CM

## 2018-04-17 DIAGNOSIS — M54.12 CERVICAL RADICULITIS: ICD-10-CM

## 2018-04-17 PROCEDURE — 4040F PNEUMOC VAC/ADMIN/RCVD: CPT | Performed by: INTERNAL MEDICINE

## 2018-04-17 PROCEDURE — G8400 PT W/DXA NO RESULTS DOC: HCPCS | Performed by: INTERNAL MEDICINE

## 2018-04-17 PROCEDURE — 1123F ACP DISCUSS/DSCN MKR DOCD: CPT | Performed by: INTERNAL MEDICINE

## 2018-04-17 PROCEDURE — 1036F TOBACCO NON-USER: CPT | Performed by: INTERNAL MEDICINE

## 2018-04-17 PROCEDURE — 99214 OFFICE O/P EST MOD 30 MIN: CPT | Performed by: INTERNAL MEDICINE

## 2018-04-17 PROCEDURE — 1090F PRES/ABSN URINE INCON ASSESS: CPT | Performed by: INTERNAL MEDICINE

## 2018-04-17 PROCEDURE — G8598 ASA/ANTIPLAT THER USED: HCPCS | Performed by: INTERNAL MEDICINE

## 2018-04-17 PROCEDURE — G8427 DOCREV CUR MEDS BY ELIG CLIN: HCPCS | Performed by: INTERNAL MEDICINE

## 2018-04-17 PROCEDURE — G8420 CALC BMI NORM PARAMETERS: HCPCS | Performed by: INTERNAL MEDICINE

## 2018-04-17 RX ORDER — OXYCODONE AND ACETAMINOPHEN 7.5; 325 MG/1; MG/1
1 TABLET ORAL EVERY 6 HOURS PRN
Qty: 105 TABLET | Refills: 0 | Status: SHIPPED | OUTPATIENT
Start: 2018-04-17 | End: 2018-05-21 | Stop reason: SDUPTHER

## 2018-04-17 RX ORDER — GABAPENTIN 300 MG/1
300 CAPSULE ORAL 2 TIMES DAILY
Qty: 60 CAPSULE | Refills: 1 | Status: SHIPPED | OUTPATIENT
Start: 2018-04-17 | End: 2018-05-21 | Stop reason: SDUPTHER

## 2018-04-17 RX ORDER — MELOXICAM 15 MG/1
TABLET ORAL
Qty: 30 TABLET | Refills: 1 | Status: SHIPPED | OUTPATIENT
Start: 2018-04-17 | End: 2018-05-21 | Stop reason: SDUPTHER

## 2018-04-17 RX ORDER — QUETIAPINE FUMARATE 25 MG/1
25 TABLET, FILM COATED ORAL NIGHTLY
Qty: 30 TABLET | Refills: 1 | Status: SHIPPED | OUTPATIENT
Start: 2018-04-17 | End: 2018-05-21 | Stop reason: SDUPTHER

## 2018-04-20 RX ORDER — DULOXETIN HYDROCHLORIDE 30 MG/1
30 CAPSULE, DELAYED RELEASE ORAL DAILY
Qty: 30 CAPSULE | Refills: 3 | Status: SHIPPED | OUTPATIENT
Start: 2018-04-20 | End: 2018-05-21 | Stop reason: SDUPTHER

## 2018-05-21 ENCOUNTER — OFFICE VISIT (OUTPATIENT)
Dept: PAIN MANAGEMENT | Age: 83
End: 2018-05-21

## 2018-05-21 VITALS
WEIGHT: 105.2 LBS | BODY MASS INDEX: 18.64 KG/M2 | SYSTOLIC BLOOD PRESSURE: 112 MMHG | HEART RATE: 50 BPM | DIASTOLIC BLOOD PRESSURE: 55 MMHG

## 2018-05-21 DIAGNOSIS — R25.1 TREMORS OF NERVOUS SYSTEM: ICD-10-CM

## 2018-05-21 DIAGNOSIS — M54.12 CERVICAL RADICULITIS: ICD-10-CM

## 2018-05-21 DIAGNOSIS — M96.1 POST-LAMINECTOMY SYNDROME: ICD-10-CM

## 2018-05-21 DIAGNOSIS — F32.A MOOD DISORDER OF DEPRESSED TYPE: ICD-10-CM

## 2018-05-21 DIAGNOSIS — M48.02 SPINAL STENOSIS OF CERVICAL REGION: ICD-10-CM

## 2018-05-21 DIAGNOSIS — R53.82 CHRONIC FATIGUE: ICD-10-CM

## 2018-05-21 DIAGNOSIS — F51.01 PRIMARY INSOMNIA: ICD-10-CM

## 2018-05-21 DIAGNOSIS — M47.816 LUMBAR SPONDYLOSIS: ICD-10-CM

## 2018-05-21 DIAGNOSIS — M50.30 DDD (DEGENERATIVE DISC DISEASE), CERVICAL: ICD-10-CM

## 2018-05-21 DIAGNOSIS — G89.4 CHRONIC PAIN SYNDROME: ICD-10-CM

## 2018-05-21 PROCEDURE — G8427 DOCREV CUR MEDS BY ELIG CLIN: HCPCS | Performed by: INTERNAL MEDICINE

## 2018-05-21 PROCEDURE — G8420 CALC BMI NORM PARAMETERS: HCPCS | Performed by: INTERNAL MEDICINE

## 2018-05-21 PROCEDURE — 1036F TOBACCO NON-USER: CPT | Performed by: INTERNAL MEDICINE

## 2018-05-21 PROCEDURE — 1123F ACP DISCUSS/DSCN MKR DOCD: CPT | Performed by: INTERNAL MEDICINE

## 2018-05-21 PROCEDURE — G8400 PT W/DXA NO RESULTS DOC: HCPCS | Performed by: INTERNAL MEDICINE

## 2018-05-21 PROCEDURE — 1090F PRES/ABSN URINE INCON ASSESS: CPT | Performed by: INTERNAL MEDICINE

## 2018-05-21 PROCEDURE — 4040F PNEUMOC VAC/ADMIN/RCVD: CPT | Performed by: INTERNAL MEDICINE

## 2018-05-21 PROCEDURE — G8598 ASA/ANTIPLAT THER USED: HCPCS | Performed by: INTERNAL MEDICINE

## 2018-05-21 PROCEDURE — 99214 OFFICE O/P EST MOD 30 MIN: CPT | Performed by: INTERNAL MEDICINE

## 2018-05-21 RX ORDER — QUETIAPINE FUMARATE 25 MG/1
25 TABLET, FILM COATED ORAL NIGHTLY
Qty: 30 TABLET | Refills: 0 | Status: SHIPPED | OUTPATIENT
Start: 2018-05-21 | End: 2018-06-08 | Stop reason: CLARIF

## 2018-05-21 RX ORDER — MELOXICAM 15 MG/1
TABLET ORAL
Qty: 30 TABLET | Refills: 0 | Status: SHIPPED | OUTPATIENT
Start: 2018-05-21 | End: 2018-06-18 | Stop reason: SDUPTHER

## 2018-05-21 RX ORDER — NALOXONE HYDROCHLORIDE 2 MG/.4ML
INJECTION, SOLUTION INTRAMUSCULAR; SUBCUTANEOUS
Qty: 1 PACKAGE | Refills: 0 | Status: SHIPPED | OUTPATIENT
Start: 2018-05-21 | End: 2018-06-18 | Stop reason: SDUPTHER

## 2018-05-21 RX ORDER — DULOXETIN HYDROCHLORIDE 30 MG/1
30 CAPSULE, DELAYED RELEASE ORAL DAILY
Qty: 30 CAPSULE | Refills: 0 | Status: SHIPPED | OUTPATIENT
Start: 2018-05-21 | End: 2018-06-18 | Stop reason: SDUPTHER

## 2018-05-21 RX ORDER — METOPROLOL SUCCINATE 100 MG/1
100 TABLET, EXTENDED RELEASE ORAL DAILY
Qty: 30 TABLET | Refills: 0 | Status: ON HOLD | OUTPATIENT
Start: 2018-05-21 | End: 2018-06-12

## 2018-05-21 RX ORDER — GABAPENTIN 300 MG/1
300 CAPSULE ORAL 2 TIMES DAILY
Qty: 60 CAPSULE | Refills: 0 | Status: SHIPPED | OUTPATIENT
Start: 2018-05-21 | End: 2018-06-18 | Stop reason: SDUPTHER

## 2018-05-21 RX ORDER — OXYCODONE AND ACETAMINOPHEN 7.5; 325 MG/1; MG/1
1 TABLET ORAL EVERY 6 HOURS PRN
Qty: 112 TABLET | Refills: 0 | Status: SHIPPED | OUTPATIENT
Start: 2018-05-21 | End: 2018-06-18 | Stop reason: SDUPTHER

## 2018-06-08 PROBLEM — I48.91 ATRIAL FIBRILLATION, RAPID (HCC): Status: ACTIVE | Noted: 2018-06-08

## 2018-06-17 DIAGNOSIS — G89.4 CHRONIC PAIN SYNDROME: ICD-10-CM

## 2018-06-17 DIAGNOSIS — F51.01 PRIMARY INSOMNIA: ICD-10-CM

## 2018-06-17 DIAGNOSIS — R25.1 TREMORS OF NERVOUS SYSTEM: ICD-10-CM

## 2018-06-18 ENCOUNTER — OFFICE VISIT (OUTPATIENT)
Dept: PAIN MANAGEMENT | Age: 83
End: 2018-06-18

## 2018-06-18 VITALS — SYSTOLIC BLOOD PRESSURE: 169 MMHG | DIASTOLIC BLOOD PRESSURE: 75 MMHG | HEART RATE: 52 BPM

## 2018-06-18 DIAGNOSIS — M48.02 SPINAL STENOSIS OF CERVICAL REGION: ICD-10-CM

## 2018-06-18 DIAGNOSIS — F32.A MOOD DISORDER OF DEPRESSED TYPE: ICD-10-CM

## 2018-06-18 DIAGNOSIS — G89.4 CHRONIC PAIN SYNDROME: ICD-10-CM

## 2018-06-18 DIAGNOSIS — R53.82 CHRONIC FATIGUE: ICD-10-CM

## 2018-06-18 DIAGNOSIS — M47.816 LUMBAR SPONDYLOSIS: ICD-10-CM

## 2018-06-18 DIAGNOSIS — M96.1 POST-LAMINECTOMY SYNDROME: ICD-10-CM

## 2018-06-18 DIAGNOSIS — R25.1 TREMORS OF NERVOUS SYSTEM: ICD-10-CM

## 2018-06-18 DIAGNOSIS — F51.01 PRIMARY INSOMNIA: ICD-10-CM

## 2018-06-18 DIAGNOSIS — M54.12 CERVICAL RADICULITIS: ICD-10-CM

## 2018-06-18 DIAGNOSIS — G56.02 CARPAL TUNNEL SYNDROME ON LEFT: ICD-10-CM

## 2018-06-18 DIAGNOSIS — K59.03 DRUG-INDUCED CONSTIPATION: ICD-10-CM

## 2018-06-18 DIAGNOSIS — M50.30 DDD (DEGENERATIVE DISC DISEASE), CERVICAL: ICD-10-CM

## 2018-06-18 PROCEDURE — 1111F DSCHRG MED/CURRENT MED MERGE: CPT | Performed by: INTERNAL MEDICINE

## 2018-06-18 PROCEDURE — 99213 OFFICE O/P EST LOW 20 MIN: CPT | Performed by: INTERNAL MEDICINE

## 2018-06-18 PROCEDURE — 4040F PNEUMOC VAC/ADMIN/RCVD: CPT | Performed by: INTERNAL MEDICINE

## 2018-06-18 PROCEDURE — G8400 PT W/DXA NO RESULTS DOC: HCPCS | Performed by: INTERNAL MEDICINE

## 2018-06-18 PROCEDURE — 1123F ACP DISCUSS/DSCN MKR DOCD: CPT | Performed by: INTERNAL MEDICINE

## 2018-06-18 PROCEDURE — 1090F PRES/ABSN URINE INCON ASSESS: CPT | Performed by: INTERNAL MEDICINE

## 2018-06-18 PROCEDURE — 1036F TOBACCO NON-USER: CPT | Performed by: INTERNAL MEDICINE

## 2018-06-18 PROCEDURE — G8598 ASA/ANTIPLAT THER USED: HCPCS | Performed by: INTERNAL MEDICINE

## 2018-06-18 PROCEDURE — G8427 DOCREV CUR MEDS BY ELIG CLIN: HCPCS | Performed by: INTERNAL MEDICINE

## 2018-06-18 PROCEDURE — G8420 CALC BMI NORM PARAMETERS: HCPCS | Performed by: INTERNAL MEDICINE

## 2018-06-18 RX ORDER — OXYCODONE AND ACETAMINOPHEN 7.5; 325 MG/1; MG/1
1 TABLET ORAL EVERY 6 HOURS PRN
Qty: 112 TABLET | Refills: 0 | Status: SHIPPED | OUTPATIENT
Start: 2018-06-18 | End: 2018-07-16

## 2018-06-18 RX ORDER — DULOXETIN HYDROCHLORIDE 30 MG/1
30 CAPSULE, DELAYED RELEASE ORAL DAILY
Qty: 30 CAPSULE | Refills: 0 | Status: ON HOLD | OUTPATIENT
Start: 2018-06-18 | End: 2020-01-01 | Stop reason: ALTCHOICE

## 2018-06-18 RX ORDER — GABAPENTIN 300 MG/1
300 CAPSULE ORAL 2 TIMES DAILY
Qty: 60 CAPSULE | Refills: 0 | Status: SHIPPED | OUTPATIENT
Start: 2018-06-18 | End: 2018-07-17 | Stop reason: SDUPTHER

## 2018-06-18 RX ORDER — NALOXONE HYDROCHLORIDE 2 MG/.4ML
INJECTION, SOLUTION INTRAMUSCULAR; SUBCUTANEOUS
Qty: 1 PACKAGE | Refills: 0 | Status: SHIPPED | OUTPATIENT
Start: 2018-06-18

## 2018-06-18 RX ORDER — MELOXICAM 15 MG/1
TABLET ORAL
Qty: 30 TABLET | Refills: 0 | Status: SHIPPED | OUTPATIENT
Start: 2018-06-18 | End: 2018-08-15

## 2018-06-19 ENCOUNTER — TELEPHONE (OUTPATIENT)
Dept: PAIN MANAGEMENT | Age: 83
End: 2018-06-19

## 2018-06-19 RX ORDER — QUETIAPINE FUMARATE 25 MG/1
TABLET, FILM COATED ORAL
Qty: 30 TABLET | Refills: 0 | OUTPATIENT
Start: 2018-06-19

## 2018-06-19 RX ORDER — METOPROLOL SUCCINATE 100 MG/1
TABLET, EXTENDED RELEASE ORAL
Qty: 30 TABLET | Refills: 0 | OUTPATIENT
Start: 2018-06-19

## 2018-07-17 DIAGNOSIS — M47.816 LUMBAR SPONDYLOSIS: ICD-10-CM

## 2018-07-17 DIAGNOSIS — G89.4 CHRONIC PAIN SYNDROME: ICD-10-CM

## 2018-07-17 DIAGNOSIS — R25.1 TREMORS OF NERVOUS SYSTEM: ICD-10-CM

## 2018-07-17 DIAGNOSIS — M48.02 SPINAL STENOSIS OF CERVICAL REGION: ICD-10-CM

## 2018-07-17 DIAGNOSIS — M54.12 CERVICAL RADICULITIS: ICD-10-CM

## 2018-07-17 DIAGNOSIS — M96.1 POST-LAMINECTOMY SYNDROME: ICD-10-CM

## 2018-07-17 RX ORDER — GABAPENTIN 300 MG/1
300 CAPSULE ORAL 2 TIMES DAILY
Qty: 60 CAPSULE | Refills: 0 | Status: SHIPPED | OUTPATIENT
Start: 2018-07-17 | End: 2018-09-11 | Stop reason: SDUPTHER

## 2018-08-15 ENCOUNTER — OFFICE VISIT (OUTPATIENT)
Dept: CARDIOLOGY CLINIC | Age: 83
End: 2018-08-15

## 2018-08-15 VITALS
SYSTOLIC BLOOD PRESSURE: 136 MMHG | WEIGHT: 104 LBS | DIASTOLIC BLOOD PRESSURE: 78 MMHG | HEART RATE: 64 BPM | BODY MASS INDEX: 18.42 KG/M2

## 2018-08-15 DIAGNOSIS — I65.23 BILATERAL CAROTID ARTERY STENOSIS: ICD-10-CM

## 2018-08-15 DIAGNOSIS — I10 ESSENTIAL HYPERTENSION: ICD-10-CM

## 2018-08-15 DIAGNOSIS — I50.32 CHRONIC DIASTOLIC CHF (CONGESTIVE HEART FAILURE) (HCC): ICD-10-CM

## 2018-08-15 DIAGNOSIS — I48.0 PAROXYSMAL ATRIAL FIBRILLATION (HCC): Primary | ICD-10-CM

## 2018-08-15 DIAGNOSIS — J43.8 OTHER EMPHYSEMA (HCC): ICD-10-CM

## 2018-08-15 PROCEDURE — G8400 PT W/DXA NO RESULTS DOC: HCPCS | Performed by: INTERNAL MEDICINE

## 2018-08-15 PROCEDURE — 4040F PNEUMOC VAC/ADMIN/RCVD: CPT | Performed by: INTERNAL MEDICINE

## 2018-08-15 PROCEDURE — 1036F TOBACCO NON-USER: CPT | Performed by: INTERNAL MEDICINE

## 2018-08-15 PROCEDURE — 1101F PT FALLS ASSESS-DOCD LE1/YR: CPT | Performed by: INTERNAL MEDICINE

## 2018-08-15 PROCEDURE — G8926 SPIRO NO PERF OR DOC: HCPCS | Performed by: INTERNAL MEDICINE

## 2018-08-15 PROCEDURE — 99214 OFFICE O/P EST MOD 30 MIN: CPT | Performed by: INTERNAL MEDICINE

## 2018-08-15 PROCEDURE — 1123F ACP DISCUSS/DSCN MKR DOCD: CPT | Performed by: INTERNAL MEDICINE

## 2018-08-15 PROCEDURE — 3023F SPIROM DOC REV: CPT | Performed by: INTERNAL MEDICINE

## 2018-08-15 PROCEDURE — 1090F PRES/ABSN URINE INCON ASSESS: CPT | Performed by: INTERNAL MEDICINE

## 2018-08-15 PROCEDURE — G8427 DOCREV CUR MEDS BY ELIG CLIN: HCPCS | Performed by: INTERNAL MEDICINE

## 2018-08-15 PROCEDURE — G8598 ASA/ANTIPLAT THER USED: HCPCS | Performed by: INTERNAL MEDICINE

## 2018-08-15 PROCEDURE — G8419 CALC BMI OUT NRM PARAM NOF/U: HCPCS | Performed by: INTERNAL MEDICINE

## 2018-08-15 RX ORDER — ASPIRIN 81 MG/1
81 TABLET ORAL DAILY
Qty: 90 TABLET | Refills: 3 | Status: SHIPPED | OUTPATIENT
Start: 2018-08-15 | End: 2019-04-03

## 2018-08-15 RX ORDER — MIRTAZAPINE 15 MG/1
15 TABLET, FILM COATED ORAL NIGHTLY
Status: ON HOLD | COMMUNITY
End: 2020-01-01 | Stop reason: ALTCHOICE

## 2018-08-15 RX ORDER — OXYCODONE HCL 10 MG/1
10 TABLET, FILM COATED, EXTENDED RELEASE ORAL EVERY 12 HOURS
Status: ON HOLD | COMMUNITY
End: 2020-01-01 | Stop reason: ALTCHOICE

## 2018-08-15 RX ORDER — OXYCODONE AND ACETAMINOPHEN 10; 325 MG/1; MG/1
1 TABLET ORAL EVERY 6 HOURS PRN
COMMUNITY

## 2018-08-15 NOTE — PROGRESS NOTES
CC/HPI:   80 y.o. here for a fib. No cp or sob. No n/v/LH/dizziness. No syncope. Not on anticoag d/t fall risk, and she actually fell this morning. No LE edema. No orthopnea. No PND. Does have a cough. Has some sob; hasn't had inhalers and feels like she needs them. Past Medical History:   Diagnosis Date    AAA (abdominal aortic aneurysm) (HCC)     Arthritis     Atrial fibrillation (HCC)     Paroxysmal artial fib during hospitalization for sepsis; seen by Dr. Ilana Jimenez, later had stress test; anticoagulated with Plavix    Atypical chest pain 7/30/2013    Carpal tunnel syndrome of left wrist     Chronic pain syndrome     COPD (chronic obstructive pulmonary disease) ([address]     Duodenal ulcer 5/10/2012    History of atrial fibrillation 11/12/2015    Hypertension     Insomnia     Lumbar spondylosis     Lumbar spondylosis     Mucopurulent chronic bronchitis ([address]     Peripheral arterial occlusive disease ([address]     Post-laminectomy syndrome     Pre-syncope 1/10/2013    1/13 Admitted to [facility]. May well have been a seizure. Family observed 3-5 min of shaking of extremities.      Renal artery stenosis ([address] 10/09    [facility] records    Renal artery stenosis ([address]     SBO (small bowel obstruction) ([address]     Seizure ([address] 1/9/2013    Spinal stenosis      Past Surgical History:   Procedure Laterality Date    ABDOMEN SURGERY N/A 03/17/2016    LAPAROSCOPIC CHOLECYSTECTOMY WITH INTRAOPERATIVE    BACK SURGERY      COLONOSCOPY  1/10    Dr. Shruti Bruno; 2 small polyps, adenomas    HYSTERECTOMY      OTHER SURGICAL HISTORY      excision of tender calcified mass    OTHER SURGICAL HISTORY      blood clots     OTHER SURGICAL HISTORY  02/14/11    right femoral popliteal bypass     Social History   Substance Use Topics    Smoking status: Former Smoker     Packs/day: 1.00     Years: 35.00     Types: Cigarettes     Quit date: 1/17/2012    Smokeless tobacco: Never Used      Comment: using e cigarette  Alcohol use 0.6 oz/week     1 Shots of liquor per week      Comment: 2-3 times a year     No family history on file. Review of Systems  General: No fevers, chills, fatigue, or night sweats. No abnormal changes in in weight. HEENT: No blurry or decreased vision. No changes in hearing, nasal discharge or sore throat. Cardiovascular:  See HPI. No cramping in legs or buttocks when walking. Respiratory: No cough, hemoptysis, or wheezing. +COPD  Gastrointestinal:  No abdominal pain, hematochezia, melana, constipation, diarrhea, or history of GI ulcers. Genito-Urinary: No dysuria or hematuria  Musculoskeletal:  No complaints of joint pain, joint swelling or muscular weakness/soreness. Neurological:  No dizziness, headaches, numbness/tingling, speech problems or weakness. No history of a stroke or TIA. Psychological:  No anxiety or depression. Hematological and Lymphatic: No abnormal bleeding or bruising, blood clots, jaundice or swollen lymph nodes. Endocrine:  No known history of DM or thyroid disease. No malaise/lethargy, palpitations, polydipsia/polyuria, temperature intolerance or unexpected weight changes. Skin:  No rashes or non-healing ulcers. Physical Exam:  Blood pressure 136/78, pulse 64, weight 104 lb (47.2 kg), not currently breastfeeding. General (appearance): Well devel. No distress  Eyes: anicteric. eomi  Neck: Supple. No JVD  Ears/Nose/Mouth/Thorat: No cyanosis  CV: RRR. No m/r/g   Respiratory:  Coarse sounds that clear with cough. Normal respiratory effort  GI: Abd soft. NT. No peritoneal signs  Skin: Warm, dry. No rashes  Neuro/Psych: Alert and oriented x 3. Appropriate behavior  Ext:  No c/c.  No edema  Pulses:  2+ carotid      CBC:  Lab Results   Component Value Date    WBC 5.0 06/12/2018    HGB 9.1 (L) 06/12/2018    HCT 27.6 (L) 06/12/2018    MCV 87.9 06/12/2018     06/12/2018     BMP:  Lab Results   Component Value Date     06/12/2018    K 4.1 06/12/2018    K 4.6 06/09/2018     06/12/2018    CO2 21 06/12/2018    BUN 17 06/12/2018    CREATININE 0.7 06/12/2018    GLUCOSE 89 06/12/2018    CALCIUM 8.5 06/12/2018    Mag:   Lab Results   Component Value Date    MG 2.60 06/12/2018     LIVER PROFILE:  Lab Results   Component Value Date    ALT 6 (L) 06/09/2018    AST 13 (L) 06/09/2018    ALKPHOS 100 06/09/2018    BILITOT 0.3 06/09/2018     PT/INR:  Lab Results   Component Value Date    INR 2.22 (H) 01/01/2018    INR 1.09 01/26/2016    INR 1.09 10/25/2015    PROTIME 25.1 (H) 01/01/2018    PROTIME 12.4 01/26/2016    PROTIME 11.8 10/25/2015     BNP:   Lab Results   Component Value Date    BNP 1,378.0 (H) 06/08/2018     CARDIAC ENZYMES:  Lab Results   Component Value Date    CKTOTAL 123 01/10/2013    CKMB 1.8 08/17/2012    TROPONINI 0.01 06/21/2018     TSH:   Lab Results   Component Value Date    TSH 2.31 08/22/2016     LIPIDS:   Lab Results   Component Value Date    CHOL 162 11/12/2015    CHOL 180 07/28/2015    CHOL 194 09/23/2014     Lab Results   Component Value Date    TRIG 129 11/12/2015    TRIG 192 (H) 07/28/2015    TRIG 151 (H) 09/23/2014     Lab Results   Component Value Date    HDL 45 11/12/2015    HDL 54 07/28/2015    HDL 56 09/23/2014     Lab Results   Component Value Date    LDLCALC 91 11/12/2015    LDLCALC 88 07/28/2015    LDLCALC 108 (H) 09/23/2014     Lab Results   Component Value Date    LABVLDL 26 11/12/2015    LABVLDL 38 07/28/2015    LABVLDL 30 09/23/2014     No results found for: CHOLHDLRATIO      10/22/15 MRCP:  Abrupt tapering of the distal common bile duct, with biliary and    pancreatic duct dilatation. 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 ERCP is recommended for further evaluation. Findings of acute cholecystitis again noted. 10/21/15:   Normal left ventricle size and systolic function with an estimated ejection fraction of 55%. There is mild left ventricular hypertrophy. No regional  wall motion abnormalities are seen.   Moderate to

## 2018-08-17 DIAGNOSIS — M50.30 DDD (DEGENERATIVE DISC DISEASE), CERVICAL: ICD-10-CM

## 2018-08-17 DIAGNOSIS — G89.4 CHRONIC PAIN SYNDROME: ICD-10-CM

## 2018-08-17 DIAGNOSIS — M48.02 SPINAL STENOSIS OF CERVICAL REGION: ICD-10-CM

## 2018-08-17 DIAGNOSIS — M54.12 CERVICAL RADICULITIS: ICD-10-CM

## 2018-08-17 DIAGNOSIS — M96.1 POST-LAMINECTOMY SYNDROME: ICD-10-CM

## 2018-08-17 DIAGNOSIS — M47.816 LUMBAR SPONDYLOSIS: ICD-10-CM

## 2018-08-17 RX ORDER — MELOXICAM 15 MG/1
TABLET ORAL
Qty: 30 TABLET | Refills: 0 | OUTPATIENT
Start: 2018-08-17

## 2018-09-05 ENCOUNTER — OFFICE VISIT (OUTPATIENT)
Dept: PULMONOLOGY | Age: 83
End: 2018-09-05

## 2018-09-05 VITALS
HEIGHT: 63 IN | SYSTOLIC BLOOD PRESSURE: 110 MMHG | DIASTOLIC BLOOD PRESSURE: 70 MMHG | HEART RATE: 78 BPM | BODY MASS INDEX: 18.43 KG/M2 | RESPIRATION RATE: 16 BRPM | WEIGHT: 104 LBS | OXYGEN SATURATION: 99 %

## 2018-09-05 DIAGNOSIS — J44.9 COPD, SEVERITY TO BE DETERMINED (HCC): Primary | ICD-10-CM

## 2018-09-05 DIAGNOSIS — Z87.891 HISTORY OF TOBACCO USE: ICD-10-CM

## 2018-09-05 PROCEDURE — 4040F PNEUMOC VAC/ADMIN/RCVD: CPT | Performed by: INTERNAL MEDICINE

## 2018-09-05 PROCEDURE — G8926 SPIRO NO PERF OR DOC: HCPCS | Performed by: INTERNAL MEDICINE

## 2018-09-05 PROCEDURE — G8598 ASA/ANTIPLAT THER USED: HCPCS | Performed by: INTERNAL MEDICINE

## 2018-09-05 PROCEDURE — G8427 DOCREV CUR MEDS BY ELIG CLIN: HCPCS | Performed by: INTERNAL MEDICINE

## 2018-09-05 PROCEDURE — 3023F SPIROM DOC REV: CPT | Performed by: INTERNAL MEDICINE

## 2018-09-05 PROCEDURE — 1036F TOBACCO NON-USER: CPT | Performed by: INTERNAL MEDICINE

## 2018-09-05 PROCEDURE — 99214 OFFICE O/P EST MOD 30 MIN: CPT | Performed by: INTERNAL MEDICINE

## 2018-09-05 PROCEDURE — G8419 CALC BMI OUT NRM PARAM NOF/U: HCPCS | Performed by: INTERNAL MEDICINE

## 2018-09-05 PROCEDURE — 1090F PRES/ABSN URINE INCON ASSESS: CPT | Performed by: INTERNAL MEDICINE

## 2018-09-05 PROCEDURE — 1123F ACP DISCUSS/DSCN MKR DOCD: CPT | Performed by: INTERNAL MEDICINE

## 2018-09-05 PROCEDURE — 1101F PT FALLS ASSESS-DOCD LE1/YR: CPT | Performed by: INTERNAL MEDICINE

## 2018-09-05 PROCEDURE — G8400 PT W/DXA NO RESULTS DOC: HCPCS | Performed by: INTERNAL MEDICINE

## 2018-09-05 RX ORDER — ALBUTEROL SULFATE 90 UG/1
AEROSOL, METERED RESPIRATORY (INHALATION)
Qty: 1 INHALER | Refills: 11 | Status: SHIPPED | OUTPATIENT
Start: 2018-09-05 | End: 2019-03-25 | Stop reason: SDUPTHER

## 2018-09-06 NOTE — PROGRESS NOTES
hospitalization for sepsis; seen by Dr. Maxim Rodriguez, later had stress test; anticoagulated with Plavix    Atypical chest pain 7/30/2013    Carpal tunnel syndrome of left wrist     Chronic pain syndrome     COPD (chronic obstructive pulmonary disease) ([address]     Duodenal ulcer 5/10/2012    History of atrial fibrillation 11/12/2015    Hypertension     Insomnia     Lumbar spondylosis     Lumbar spondylosis     Mucopurulent chronic bronchitis ([address]     Peripheral arterial occlusive disease ([address]     Post-laminectomy syndrome     Pre-syncope 1/10/2013    1/13 Admitted to [facility] May well have been a seizure. Family observed 3-5 min of shaking of extremities.  Renal artery stenosis ([address] 10/09    [facility] records    Renal artery stenosis ([address]     SBO (small bowel obstruction) ([address]     Seizure ([address] 1/9/2013    Spinal stenosis        Social History:    Patient is . She smoked 2-3 packs of cigarettes a day for 30 years but quit 8 years ago. She worked as a . She currently lives with her son. Family History:  No Chronic Lung Disease    Current Medications:  Current Outpatient Prescriptions on File Prior to Visit   Medication Sig Dispense Refill    mirtazapine (REMERON) 15 MG tablet Take 15 mg by mouth nightly      oxyCODONE (OXYCONTIN) 10 MG extended release tablet Take 10 mg by mouth every 12 hours. Graham Hale oxyCODONE-acetaminophen (PERCOCET) 7.5-325 MG per tablet Take 1 tablet by mouth every 6 hours as needed for Pain. .      tiotropium (SPIRIVA) 18 MCG inhalation capsule Inhale 1 capsule into the lungs daily 90 capsule 0    fluticasone-salmeterol (ADVAIR DISKUS) 250-50 MCG/DOSE AEPB INHALE 1 PUFF INTO THE LUNGS EVERY 12 HOURS 1 Inhaler 1    aspirin EC 81 MG EC tablet Take 1 tablet by mouth daily 90 tablet 3    DULoxetine (CYMBALTA) 30 MG extended release capsule Take 1 capsule by mouth daily 30 capsule 0    Naloxone HCl (EVZIO) 2 MG/0.4ML SOAJ Use as directed 1 Package 0    diltiazem (CARDIZEM CD) 120 MG extended release capsule Take 1 capsule by mouth daily 30 capsule 3    metoprolol succinate (TOPROL XL) 50 MG extended release tablet Take 1 tablet by mouth daily 30 tablet 2    lactobacillus (CULTURELLE) CAPS capsule Take 1 capsule by mouth daily 30 capsule 0    QUEtiapine (SEROQUEL) 25 MG tablet Take 25 mg by mouth nightly as needed (sleep)      potassium chloride (KLOR-CON M) 10 MEQ extended release tablet Take 1 tablet by mouth 2 times daily 180 tablet 3    lisinopril (PRINIVIL;ZESTRIL) 10 MG tablet Take 1 tablet by mouth daily 90 tablet 3    omeprazole (PRILOSEC) 40 MG delayed release capsule TAKE 1 CAPSULE BY MOUTH DAILY 180 capsule 1    gabapentin (NEURONTIN) 300 MG capsule Take 1 capsule by mouth 2 times daily for 30 days. . 60 capsule 0     No current facility-administered medications on file prior to visit. REVIEW OF SYSTEMS:    CONSTITUTIONAL: Negative for fevers and chills  HEENT: Negative for oropharyngeal exudate, post nasal drip, sinus pain / pressure, nasal congestion, ear pain  RESPIRATORY:  See HPI  CARDIOVASCULAR: Negative for chest pain, palpitations, edema  GASTROINTESTINAL: Negative for nausea, vomiting, diarrhea, constipation and abdominal pain  GENITOURINARY: Negative for dysuria, urinary frequency, urinary hesitancy  HEMATOLOGICAL: Negative for adenopathy  SKIN: Negative for clubbing, cyanosis, skin lesions  ENDOCRINE: Negative for polyuria, polydipsia, heat intolerance, cold intolerance   EXTREMITIES: Negative for weakness, decreased ROM  NEUROLOGICAL: Negative for unilateral weakness, speech or gait abnormalities    Objective:   PHYSICAL EXAM:        VITALS:  /70 (Site: Left Arm, Position: Sitting, Cuff Size: Medium Adult)   Pulse 78   Resp 16   Ht 5' 3\" (1.6 m)   Wt 104 lb (47.2 kg)   SpO2 99%   Breastfeeding?  No   BMI 18.42 kg/m²     CONSTITUTIONAL:  Awake, alert, cooperative, no apparent distress, and appears stated age  [de-identified]: No oropharyngeal exudate, PERRL, no cervical adenopathy, no tracheal deviation, thyroid size normal  LUNGS:  No increased work of breathing and clear to auscultation, no crackles or wheezing  CARDIOVASCULAR:  normal S1 and S2 and no JVD  ABDOMEN:  Normal bowel sounds, non-distended and non-tender to palpation  EXT: No edema, no calf tenderness. Pulses are present bilaterally. NEUROLOGIC:  Mental Status Exam:  Level of Alertness:   awake  Orientation:   person, place, time. SKIN:  normal skin color, texture, turgor, no redness, warmth, or swelling     DATA:      Radiology Review:  Pertinent images / reports were reviewed as a part of this visit. CTPA January 24, 2018 reveals the following:  Impression       No acute pulmonary emboli.       No aneurysm or dissection of thoracic aorta.       Atelectasis and pneumonia posterior segment right upper lobe and   posterior basilar segments lower lobes bilateral.       Bilateral pleural effusions small on right and minimal on left. Chest x-ray June 21, 2018  CHEST 1 VIEW         HISTORY: Aspiration.         COMPARISON: Chest x-ray dated June 8, 2018       FINDINGS: Portable AP radiograph of the chest was obtained. The   heart and mediastinum are within normal limits for size and   configuration. No infiltrate, effusion or pneumothorax is   identified. There is calcification of the aortic arch.           Impression   1.  No evidence of acute cardiopulmonary disease. Last PFTs: September 2011  Flow studies without bronchodilators, normal FEV1/FVC ratio, with normal FVC  and FEV1. Flow volume loop somewhat erratic, but appears normal.      Lung volumes were not completely done due to the patients difficulty with the  testing procedure, but the vital capacity is noted to be normal.      Arterial blood gases done on room air at rest with a pH of 7.45, pCO2 of 34  and a PaO2 of 94 are normal findings. IMPRESSION:    1.   No evidence of obstruction to air flow. 2.  Limited volume studies, but no significant evidence of restrictive  disease. 3.  Resting arterial blood gases on room air are normal.     Assessment:      Diagnosis Orders   1. COPD, severity to be determined ([address]     2. History of tobacco use         Plan:     Clinically patient's history is consistent with COPD. She did have PFTs in 2011 that did not demonstrate any obstructive airflow however that was 7 years ago. She seems symptomatically better with Advair, Spiriva, and as needed albuterol. At this time I don't see any real benefit to repeating PFTs. Dr. Gomez has refilled her Spiriva and Advair. I wrote a prescription for albuterol. Patient is not smoking. She is not a candidate for lung cancer screening.   She is up-to-date with Pneumovax and Prevnar 13    Return to clinic in 6 months or sooner if needed

## 2018-09-11 DIAGNOSIS — M96.1 POST-LAMINECTOMY SYNDROME: ICD-10-CM

## 2018-09-11 DIAGNOSIS — M48.02 SPINAL STENOSIS OF CERVICAL REGION: ICD-10-CM

## 2018-09-11 DIAGNOSIS — R25.1 TREMORS OF NERVOUS SYSTEM: ICD-10-CM

## 2018-09-11 DIAGNOSIS — M54.12 CERVICAL RADICULITIS: ICD-10-CM

## 2018-09-11 DIAGNOSIS — G89.4 CHRONIC PAIN SYNDROME: ICD-10-CM

## 2018-09-11 DIAGNOSIS — M47.816 LUMBAR SPONDYLOSIS: ICD-10-CM

## 2018-09-11 RX ORDER — GABAPENTIN 300 MG/1
300 CAPSULE ORAL 2 TIMES DAILY
Qty: 60 CAPSULE | Refills: 0 | Status: SHIPPED | OUTPATIENT
Start: 2018-09-11 | End: 2023-05-14

## 2018-11-21 ENCOUNTER — OFFICE VISIT (OUTPATIENT)
Dept: CARDIOLOGY CLINIC | Age: 83
End: 2018-11-21
Payer: MEDICARE

## 2018-11-21 VITALS
BODY MASS INDEX: 20.96 KG/M2 | WEIGHT: 118.34 LBS | DIASTOLIC BLOOD PRESSURE: 70 MMHG | HEART RATE: 82 BPM | SYSTOLIC BLOOD PRESSURE: 116 MMHG

## 2018-11-21 DIAGNOSIS — I48.0 PAROXYSMAL ATRIAL FIBRILLATION (HCC): ICD-10-CM

## 2018-11-21 DIAGNOSIS — I50.32 CHRONIC DIASTOLIC CHF (CONGESTIVE HEART FAILURE) (HCC): Primary | ICD-10-CM

## 2018-11-21 DIAGNOSIS — I10 ESSENTIAL HYPERTENSION: ICD-10-CM

## 2018-11-21 PROCEDURE — G8420 CALC BMI NORM PARAMETERS: HCPCS | Performed by: NURSE PRACTITIONER

## 2018-11-21 PROCEDURE — 1123F ACP DISCUSS/DSCN MKR DOCD: CPT | Performed by: NURSE PRACTITIONER

## 2018-11-21 PROCEDURE — 4040F PNEUMOC VAC/ADMIN/RCVD: CPT | Performed by: NURSE PRACTITIONER

## 2018-11-21 PROCEDURE — G8427 DOCREV CUR MEDS BY ELIG CLIN: HCPCS | Performed by: NURSE PRACTITIONER

## 2018-11-21 PROCEDURE — 1036F TOBACCO NON-USER: CPT | Performed by: NURSE PRACTITIONER

## 2018-11-21 PROCEDURE — G8484 FLU IMMUNIZE NO ADMIN: HCPCS | Performed by: NURSE PRACTITIONER

## 2018-11-21 PROCEDURE — 1101F PT FALLS ASSESS-DOCD LE1/YR: CPT | Performed by: NURSE PRACTITIONER

## 2018-11-21 PROCEDURE — 1090F PRES/ABSN URINE INCON ASSESS: CPT | Performed by: NURSE PRACTITIONER

## 2018-11-21 PROCEDURE — G8400 PT W/DXA NO RESULTS DOC: HCPCS | Performed by: NURSE PRACTITIONER

## 2018-11-21 PROCEDURE — G8598 ASA/ANTIPLAT THER USED: HCPCS | Performed by: NURSE PRACTITIONER

## 2018-11-21 PROCEDURE — 99214 OFFICE O/P EST MOD 30 MIN: CPT | Performed by: NURSE PRACTITIONER

## 2019-01-01 ENCOUNTER — TELEPHONE (OUTPATIENT)
Dept: CARDIOLOGY CLINIC | Age: 84
End: 2019-01-01

## 2019-01-01 ENCOUNTER — HOSPITAL ENCOUNTER (OUTPATIENT)
Dept: CT IMAGING | Age: 84
Discharge: HOME | End: 2019-12-19
Payer: MEDICARE

## 2019-01-01 ENCOUNTER — OFFICE VISIT (OUTPATIENT)
Dept: VASCULAR SURGERY | Age: 84
End: 2019-01-01
Payer: MEDICARE

## 2019-01-01 ENCOUNTER — TELEPHONE (OUTPATIENT)
Dept: SURGERY | Age: 84
End: 2019-01-01

## 2019-01-01 ENCOUNTER — OFFICE VISIT (OUTPATIENT)
Dept: PULMONOLOGY | Age: 84
End: 2019-01-01
Payer: MEDICARE

## 2019-01-01 ENCOUNTER — OFFICE VISIT (OUTPATIENT)
Dept: CARDIOLOGY CLINIC | Age: 84
End: 2019-01-01
Payer: MEDICARE

## 2019-01-01 ENCOUNTER — HOSPITAL ENCOUNTER (OUTPATIENT)
Dept: ULTRASOUND IMAGING | Age: 84
Discharge: HOME | End: 2019-11-05
Payer: MEDICARE

## 2019-01-01 VITALS
RESPIRATION RATE: 16 BRPM | SYSTOLIC BLOOD PRESSURE: 116 MMHG | HEIGHT: 63 IN | DIASTOLIC BLOOD PRESSURE: 70 MMHG | WEIGHT: 130 LBS | BODY MASS INDEX: 23.04 KG/M2 | OXYGEN SATURATION: 97 % | HEART RATE: 90 BPM

## 2019-01-01 VITALS
HEART RATE: 85 BPM | HEIGHT: 63 IN | DIASTOLIC BLOOD PRESSURE: 77 MMHG | OXYGEN SATURATION: 98 % | BODY MASS INDEX: 23.92 KG/M2 | SYSTOLIC BLOOD PRESSURE: 120 MMHG | WEIGHT: 135 LBS | TEMPERATURE: 98.2 F

## 2019-01-01 VITALS
BODY MASS INDEX: 23.91 KG/M2 | HEART RATE: 68 BPM | DIASTOLIC BLOOD PRESSURE: 80 MMHG | WEIGHT: 135 LBS | SYSTOLIC BLOOD PRESSURE: 150 MMHG

## 2019-01-01 VITALS
DIASTOLIC BLOOD PRESSURE: 68 MMHG | HEIGHT: 63 IN | WEIGHT: 135 LBS | BODY MASS INDEX: 23.92 KG/M2 | SYSTOLIC BLOOD PRESSURE: 105 MMHG | TEMPERATURE: 98.9 F | OXYGEN SATURATION: 96 % | HEART RATE: 98 BPM

## 2019-01-01 VITALS
HEART RATE: 58 BPM | DIASTOLIC BLOOD PRESSURE: 74 MMHG | SYSTOLIC BLOOD PRESSURE: 130 MMHG | OXYGEN SATURATION: 92 % | WEIGHT: 130 LBS | BODY MASS INDEX: 23.78 KG/M2

## 2019-01-01 DIAGNOSIS — I48.0 PAF (PAROXYSMAL ATRIAL FIBRILLATION) (HCC): ICD-10-CM

## 2019-01-01 DIAGNOSIS — I71.40 ABDOMINAL AORTIC ANEURYSM (AAA) WITHOUT RUPTURE: Primary | ICD-10-CM

## 2019-01-01 DIAGNOSIS — E78.2 MIXED HYPERLIPIDEMIA: ICD-10-CM

## 2019-01-01 DIAGNOSIS — I10 ESSENTIAL HYPERTENSION: ICD-10-CM

## 2019-01-01 DIAGNOSIS — I48.20 CHRONIC ATRIAL FIBRILLATION (HCC): ICD-10-CM

## 2019-01-01 DIAGNOSIS — I34.0 NONRHEUMATIC MITRAL VALVE REGURGITATION: ICD-10-CM

## 2019-01-01 DIAGNOSIS — I50.32 CHRONIC DIASTOLIC CHF (CONGESTIVE HEART FAILURE) (HCC): Primary | ICD-10-CM

## 2019-01-01 DIAGNOSIS — Z13.220 LIPID SCREENING: ICD-10-CM

## 2019-01-01 DIAGNOSIS — I71.40 ABDOMINAL AORTIC ANEURYSM (AAA) WITHOUT RUPTURE: ICD-10-CM

## 2019-01-01 DIAGNOSIS — I71.40 ABDOMINAL AORTIC ANEURYSM (AAA) WITHOUT RUPTURE (HCC): ICD-10-CM

## 2019-01-01 DIAGNOSIS — J44.9 COPD, SEVERITY TO BE DETERMINED (HCC): Primary | ICD-10-CM

## 2019-01-01 DIAGNOSIS — Z87.891 HISTORY OF TOBACCO USE: ICD-10-CM

## 2019-01-01 DIAGNOSIS — I72.8 ANEURYSM OF OTHER SPECIFIED ARTERIES (HCC): ICD-10-CM

## 2019-01-01 DIAGNOSIS — I10 ESSENTIAL HYPERTENSION: Primary | ICD-10-CM

## 2019-01-01 LAB
ANION GAP SERPL CALCULATED.3IONS-SCNC: 15 MMOL/L (ref 3–16)
BUN BLDV-MCNC: 19 MG/DL (ref 7–20)
CALCIUM SERPL-MCNC: 8.8 MG/DL (ref 8.3–10.6)
CHLORIDE BLD-SCNC: 102 MMOL/L (ref 99–110)
CO2: 21 MMOL/L (ref 21–32)
CREAT SERPL-MCNC: 1.1 MG/DL (ref 0.6–1.2)
GFR AFRICAN AMERICAN: 57
GFR NON-AFRICAN AMERICAN: 47
GLUCOSE BLD-MCNC: 89 MG/DL (ref 70–99)
POTASSIUM SERPL-SCNC: 4.3 MMOL/L (ref 3.5–5.1)
SODIUM BLD-SCNC: 138 MMOL/L (ref 136–145)

## 2019-01-01 PROCEDURE — G8400 PT W/DXA NO RESULTS DOC: HCPCS | Performed by: INTERNAL MEDICINE

## 2019-01-01 PROCEDURE — G8484 FLU IMMUNIZE NO ADMIN: HCPCS | Performed by: SURGERY

## 2019-01-01 PROCEDURE — 3023F SPIROM DOC REV: CPT | Performed by: INTERNAL MEDICINE

## 2019-01-01 PROCEDURE — G8427 DOCREV CUR MEDS BY ELIG CLIN: HCPCS | Performed by: SURGERY

## 2019-01-01 PROCEDURE — G8427 DOCREV CUR MEDS BY ELIG CLIN: HCPCS | Performed by: NURSE PRACTITIONER

## 2019-01-01 PROCEDURE — 1036F TOBACCO NON-USER: CPT | Performed by: SURGERY

## 2019-01-01 PROCEDURE — 6360000004 HC RX CONTRAST MEDICATION: Performed by: SURGERY

## 2019-01-01 PROCEDURE — G8926 SPIRO NO PERF OR DOC: HCPCS | Performed by: INTERNAL MEDICINE

## 2019-01-01 PROCEDURE — 4040F PNEUMOC VAC/ADMIN/RCVD: CPT | Performed by: NURSE PRACTITIONER

## 2019-01-01 PROCEDURE — G8598 ASA/ANTIPLAT THER USED: HCPCS | Performed by: NURSE PRACTITIONER

## 2019-01-01 PROCEDURE — G8400 PT W/DXA NO RESULTS DOC: HCPCS | Performed by: NURSE PRACTITIONER

## 2019-01-01 PROCEDURE — 99214 OFFICE O/P EST MOD 30 MIN: CPT | Performed by: NURSE PRACTITIONER

## 2019-01-01 PROCEDURE — 93000 ELECTROCARDIOGRAM COMPLETE: CPT | Performed by: INTERNAL MEDICINE

## 2019-01-01 PROCEDURE — 1090F PRES/ABSN URINE INCON ASSESS: CPT | Performed by: SURGERY

## 2019-01-01 PROCEDURE — G8420 CALC BMI NORM PARAMETERS: HCPCS | Performed by: NURSE PRACTITIONER

## 2019-01-01 PROCEDURE — 4040F PNEUMOC VAC/ADMIN/RCVD: CPT | Performed by: SURGERY

## 2019-01-01 PROCEDURE — 99214 OFFICE O/P EST MOD 30 MIN: CPT | Performed by: SURGERY

## 2019-01-01 PROCEDURE — 74174 CTA ABD&PLVS W/CONTRAST: CPT

## 2019-01-01 PROCEDURE — 1090F PRES/ABSN URINE INCON ASSESS: CPT | Performed by: NURSE PRACTITIONER

## 2019-01-01 PROCEDURE — G8420 CALC BMI NORM PARAMETERS: HCPCS | Performed by: INTERNAL MEDICINE

## 2019-01-01 PROCEDURE — 1123F ACP DISCUSS/DSCN MKR DOCD: CPT | Performed by: NURSE PRACTITIONER

## 2019-01-01 PROCEDURE — G8598 ASA/ANTIPLAT THER USED: HCPCS | Performed by: SURGERY

## 2019-01-01 PROCEDURE — 1123F ACP DISCUSS/DSCN MKR DOCD: CPT | Performed by: SURGERY

## 2019-01-01 PROCEDURE — G8400 PT W/DXA NO RESULTS DOC: HCPCS | Performed by: SURGERY

## 2019-01-01 PROCEDURE — G8598 ASA/ANTIPLAT THER USED: HCPCS | Performed by: INTERNAL MEDICINE

## 2019-01-01 PROCEDURE — G8427 DOCREV CUR MEDS BY ELIG CLIN: HCPCS | Performed by: INTERNAL MEDICINE

## 2019-01-01 PROCEDURE — G8420 CALC BMI NORM PARAMETERS: HCPCS | Performed by: SURGERY

## 2019-01-01 PROCEDURE — 1036F TOBACCO NON-USER: CPT | Performed by: NURSE PRACTITIONER

## 2019-01-01 PROCEDURE — 1090F PRES/ABSN URINE INCON ASSESS: CPT | Performed by: INTERNAL MEDICINE

## 2019-01-01 PROCEDURE — 1036F TOBACCO NON-USER: CPT | Performed by: INTERNAL MEDICINE

## 2019-01-01 PROCEDURE — 1123F ACP DISCUSS/DSCN MKR DOCD: CPT | Performed by: INTERNAL MEDICINE

## 2019-01-01 PROCEDURE — 99214 OFFICE O/P EST MOD 30 MIN: CPT | Performed by: INTERNAL MEDICINE

## 2019-01-01 PROCEDURE — G8484 FLU IMMUNIZE NO ADMIN: HCPCS | Performed by: INTERNAL MEDICINE

## 2019-01-01 PROCEDURE — 4040F PNEUMOC VAC/ADMIN/RCVD: CPT | Performed by: INTERNAL MEDICINE

## 2019-01-01 PROCEDURE — 76775 US EXAM ABDO BACK WALL LIM: CPT

## 2019-01-01 PROCEDURE — 99205 OFFICE O/P NEW HI 60 MIN: CPT | Performed by: SURGERY

## 2019-01-01 RX ORDER — LISINOPRIL 5 MG/1
5 TABLET ORAL DAILY
Qty: 90 TABLET | Refills: 3 | Status: SHIPPED | OUTPATIENT
Start: 2019-01-01

## 2019-01-01 RX ORDER — ATORVASTATIN CALCIUM 20 MG/1
20 TABLET, FILM COATED ORAL DAILY
Qty: 90 TABLET | Refills: 3 | Status: SHIPPED | OUTPATIENT
Start: 2019-01-01

## 2019-01-01 RX ORDER — ALBUTEROL SULFATE 90 UG/1
AEROSOL, METERED RESPIRATORY (INHALATION)
Qty: 8.5 INHALER | Refills: 11 | Status: SHIPPED | OUTPATIENT
Start: 2019-01-01

## 2019-01-01 RX ADMIN — IOPAMIDOL 80 ML: 755 INJECTION, SOLUTION INTRAVENOUS at 08:08

## 2019-03-12 RX ORDER — POTASSIUM CHLORIDE 750 MG/1
TABLET, EXTENDED RELEASE ORAL
Qty: 180 TABLET | Refills: 1 | Status: SHIPPED | OUTPATIENT
Start: 2019-03-12 | End: 2020-01-01

## 2019-03-25 ENCOUNTER — OFFICE VISIT (OUTPATIENT)
Dept: PULMONOLOGY | Age: 84
End: 2019-03-25
Payer: MEDICARE

## 2019-03-25 VITALS
OXYGEN SATURATION: 98 % | HEART RATE: 82 BPM | WEIGHT: 124 LBS | BODY MASS INDEX: 22.82 KG/M2 | DIASTOLIC BLOOD PRESSURE: 60 MMHG | HEIGHT: 62 IN | SYSTOLIC BLOOD PRESSURE: 100 MMHG

## 2019-03-25 DIAGNOSIS — J44.1 COPD EXACERBATION (HCC): ICD-10-CM

## 2019-03-25 DIAGNOSIS — J20.9 ACUTE BRONCHITIS, UNSPECIFIED ORGANISM: ICD-10-CM

## 2019-03-25 DIAGNOSIS — J44.9 COPD, SEVERITY TO BE DETERMINED (HCC): Primary | ICD-10-CM

## 2019-03-25 PROCEDURE — 1101F PT FALLS ASSESS-DOCD LE1/YR: CPT | Performed by: INTERNAL MEDICINE

## 2019-03-25 PROCEDURE — G8926 SPIRO NO PERF OR DOC: HCPCS | Performed by: INTERNAL MEDICINE

## 2019-03-25 PROCEDURE — 1123F ACP DISCUSS/DSCN MKR DOCD: CPT | Performed by: INTERNAL MEDICINE

## 2019-03-25 PROCEDURE — 1090F PRES/ABSN URINE INCON ASSESS: CPT | Performed by: INTERNAL MEDICINE

## 2019-03-25 PROCEDURE — G8598 ASA/ANTIPLAT THER USED: HCPCS | Performed by: INTERNAL MEDICINE

## 2019-03-25 PROCEDURE — 99214 OFFICE O/P EST MOD 30 MIN: CPT | Performed by: INTERNAL MEDICINE

## 2019-03-25 PROCEDURE — G8400 PT W/DXA NO RESULTS DOC: HCPCS | Performed by: INTERNAL MEDICINE

## 2019-03-25 PROCEDURE — G8427 DOCREV CUR MEDS BY ELIG CLIN: HCPCS | Performed by: INTERNAL MEDICINE

## 2019-03-25 PROCEDURE — 4040F PNEUMOC VAC/ADMIN/RCVD: CPT | Performed by: INTERNAL MEDICINE

## 2019-03-25 PROCEDURE — 1036F TOBACCO NON-USER: CPT | Performed by: INTERNAL MEDICINE

## 2019-03-25 PROCEDURE — G8484 FLU IMMUNIZE NO ADMIN: HCPCS | Performed by: INTERNAL MEDICINE

## 2019-03-25 PROCEDURE — G8420 CALC BMI NORM PARAMETERS: HCPCS | Performed by: INTERNAL MEDICINE

## 2019-03-25 PROCEDURE — 3023F SPIROM DOC REV: CPT | Performed by: INTERNAL MEDICINE

## 2019-03-25 RX ORDER — PREDNISONE 20 MG/1
40 TABLET ORAL DAILY
Qty: 14 TABLET | Refills: 0 | Status: ON HOLD | OUTPATIENT
Start: 2019-03-25 | End: 2020-01-01 | Stop reason: ALTCHOICE

## 2019-03-25 RX ORDER — ALBUTEROL SULFATE 90 UG/1
AEROSOL, METERED RESPIRATORY (INHALATION)
Qty: 1 INHALER | Refills: 11 | Status: ON HOLD | OUTPATIENT
Start: 2019-03-25 | End: 2020-01-01

## 2019-03-25 RX ORDER — AZITHROMYCIN 250 MG/1
TABLET, FILM COATED ORAL
Qty: 6 TABLET | Refills: 0 | Status: SHIPPED | OUTPATIENT
Start: 2019-03-25 | End: 2019-01-01

## 2019-04-03 ENCOUNTER — OFFICE VISIT (OUTPATIENT)
Dept: CARDIOLOGY CLINIC | Age: 84
End: 2019-04-03
Payer: MEDICARE

## 2019-04-03 VITALS
BODY MASS INDEX: 22.68 KG/M2 | DIASTOLIC BLOOD PRESSURE: 80 MMHG | WEIGHT: 124 LBS | HEART RATE: 57 BPM | SYSTOLIC BLOOD PRESSURE: 132 MMHG

## 2019-04-03 DIAGNOSIS — J43.8 OTHER EMPHYSEMA (HCC): ICD-10-CM

## 2019-04-03 DIAGNOSIS — I50.32 CHRONIC DIASTOLIC CHF (CONGESTIVE HEART FAILURE) (HCC): Primary | ICD-10-CM

## 2019-04-03 DIAGNOSIS — I65.23 BILATERAL CAROTID ARTERY STENOSIS: ICD-10-CM

## 2019-04-03 DIAGNOSIS — I34.0 NONRHEUMATIC MITRAL VALVE REGURGITATION: ICD-10-CM

## 2019-04-03 DIAGNOSIS — I48.20 CHRONIC ATRIAL FIBRILLATION (HCC): ICD-10-CM

## 2019-04-03 DIAGNOSIS — I10 ESSENTIAL HYPERTENSION: ICD-10-CM

## 2019-04-03 PROCEDURE — G8926 SPIRO NO PERF OR DOC: HCPCS | Performed by: INTERNAL MEDICINE

## 2019-04-03 PROCEDURE — 1090F PRES/ABSN URINE INCON ASSESS: CPT | Performed by: INTERNAL MEDICINE

## 2019-04-03 PROCEDURE — G8598 ASA/ANTIPLAT THER USED: HCPCS | Performed by: INTERNAL MEDICINE

## 2019-04-03 PROCEDURE — G8420 CALC BMI NORM PARAMETERS: HCPCS | Performed by: INTERNAL MEDICINE

## 2019-04-03 PROCEDURE — G8400 PT W/DXA NO RESULTS DOC: HCPCS | Performed by: INTERNAL MEDICINE

## 2019-04-03 PROCEDURE — 3023F SPIROM DOC REV: CPT | Performed by: INTERNAL MEDICINE

## 2019-04-03 PROCEDURE — 1123F ACP DISCUSS/DSCN MKR DOCD: CPT | Performed by: INTERNAL MEDICINE

## 2019-04-03 PROCEDURE — 99214 OFFICE O/P EST MOD 30 MIN: CPT | Performed by: INTERNAL MEDICINE

## 2019-04-03 PROCEDURE — 1036F TOBACCO NON-USER: CPT | Performed by: INTERNAL MEDICINE

## 2019-04-03 PROCEDURE — G8427 DOCREV CUR MEDS BY ELIG CLIN: HCPCS | Performed by: INTERNAL MEDICINE

## 2019-04-03 PROCEDURE — 4040F PNEUMOC VAC/ADMIN/RCVD: CPT | Performed by: INTERNAL MEDICINE

## 2019-04-03 NOTE — PROGRESS NOTES
CC/HPI:   80 y.o. here for a fib. Not taking bp meds. Son handles meds. .. Used to see PCP, but per son, PCP won't see anymore. Now has an NP that comes to house and acts as PCP. Son has stopped several meds, doesn't know what she's taking off hand. No angina. No sob. Has more energy. No le edema. Legs are numb though. So are feet. No falls recently (several months). Past Medical History:   Diagnosis Date    AAA (abdominal aortic aneurysm) (HCC)     Arthritis     Atrial fibrillation (HCC)     Paroxysmal artial fib during hospitalization for sepsis; seen by Dr. Kirsten Read, later had stress test; anticoagulated with Plavix    Atypical chest pain 7/30/2013    Carpal tunnel syndrome of left wrist     Chronic pain syndrome     COPD (chronic obstructive pulmonary disease) ([address]     Duodenal ulcer 5/10/2012    History of atrial fibrillation 11/12/2015    Hypertension     Insomnia     Lumbar spondylosis     Lumbar spondylosis     Mucopurulent chronic bronchitis ([address]     Peripheral arterial occlusive disease ([address]     Post-laminectomy syndrome     Pre-syncope 1/10/2013    1/13 Admitted to [facility] May well have been a seizure. Family observed 3-5 min of shaking of extremities.      Renal artery stenosis ([address] 10/09    [facility] records    Renal artery stenosis ([address]     SBO (small bowel obstruction) ([address]     Seizure ([address] 1/9/2013    Spinal stenosis      Past Surgical History:   Procedure Laterality Date    ABDOMEN SURGERY N/A 03/17/2016    LAPAROSCOPIC CHOLECYSTECTOMY WITH INTRAOPERATIVE    BACK SURGERY      COLONOSCOPY  1/10    Dr. Toshia Harris; 2 small polyps, adenomas    HYSTERECTOMY      OTHER SURGICAL HISTORY      excision of tender calcified mass    OTHER SURGICAL HISTORY      blood clots     OTHER SURGICAL HISTORY  02/14/11    right femoral popliteal bypass     Social History     Tobacco Use    Smoking status: Former Smoker     Packs/day: 1.00     Years: 35.00     Pack years: 35.00     Types: Cigarettes     Last attempt to quit: 2012     Years since quittin.2    Smokeless tobacco: Never Used    Tobacco comment: using e cigarette   Substance Use Topics    Alcohol use: Yes     Alcohol/week: 0.6 oz     Types: 1 Shots of liquor per week     Comment: 2-3 times a year    Drug use: No     No family history on file. Review of Systems  General: No fevers, chills, fatigue, or night sweats. No abnormal changes in in weight. HEENT: No blurry or decreased vision. No changes in hearing, nasal discharge or sore throat. Cardiovascular:  See HPI. No cramping in legs or buttocks when walking. Respiratory: No cough, hemoptysis, or wheezing. + COPD  Gastrointestinal:  No abdominal pain, hematochezia, melana, constipation, diarrhea, or history of GI ulcers. Genito-Urinary: No dysuria or hematuria  Musculoskeletal:  No complaints of joint pain, joint swelling or muscular weakness/soreness. Neurological:  No dizziness, headaches, numbness/tingling, speech problems or weakness. No history of a stroke or TIA. Psychological:  No anxiety or depression. Hematological and Lymphatic: No abnormal bleeding or bruising, blood clots, jaundice or swollen lymph nodes. Endocrine:  No known history of DM or thyroid disease. No malaise/lethargy, palpitations, polydipsia/polyuria, temperature intolerance or unexpected weight changes. Skin:  No rashes or non-healing ulcers. Physical Exam:  Blood pressure 132/80, pulse 57, weight 124 lb (56.2 kg), not currently breastfeeding. General (appearance): Well devel. No distress. Eyes: Anicteric. EOMI. Neck: Supple. No JVD  Ears/Nose/Mouth/Thorat: No cyanosis  CV: Irreg irreg. No m/r/g  Respiratory:  Lungs clear ex minimal rales at bases. GI: Abd soft. Skin: Warm, dry. No rashes  Neuro/Psych: Alert and oriented x 3. Appropriate behavior  Ext:  No c/c.  Minimal edema  Pulses:  2+ carotid and radial B      CBC:  Lab Results   Component for a fib. 1. Chronic diastolic CHF (congestive heart failure) ([address]    2. Bilateral carotid artery stenosis    3. Essential hypertension    4. Other emphysema ([address]    5. Nonrheumatic mitral valve regurgitation    6. Chronic atrial fibrillation (HCC)      Plan:  -Discussed a/c again. Will stop asa and start xarelto. Call for falls and stop taking xarelto. R/B/A discussed with pt and son  -Cont other meds  -Anjana in 3 mo, with bebeto mitchell in 6 mo. BRING LIST OF MEDS. We have no idea what she's taking, and son can't tell us off the top of his head. Teddy Go Mosley MD, [facility]

## 2019-07-03 NOTE — PROGRESS NOTES
CC/HPI:  80 y.o. patient of Dr. Prudencio Parada with pAF, HTN, HLD, renal artery stenosis, bradycardia and AAA who is here for follow up. She denies cp, sob, LH/dizziness, palpitations or syncope. No LE edema, orthopnea or PND. No weight gain, fever or GI/ bleeding. Pt hasn't been taking diltiazem, toprol and lisinopril for months and she feels better and has more energy. ,     Past Medical History:   Diagnosis Date    AAA (abdominal aortic aneurysm) (HCC)     Arthritis     Atrial fibrillation (HCC)     Paroxysmal artial fib during hospitalization for sepsis; seen by Dr. Prudencio Parada, later had stress test; anticoagulated with Plavix    Atypical chest pain 7/30/2013    Carpal tunnel syndrome of left wrist     Chronic pain syndrome     COPD (chronic obstructive pulmonary disease) ([address]     Duodenal ulcer 5/10/2012    History of atrial fibrillation 11/12/2015    Hypertension     Insomnia     Lumbar spondylosis     Lumbar spondylosis     Mucopurulent chronic bronchitis ([address]     Peripheral arterial occlusive disease ([address]     Post-laminectomy syndrome     Pre-syncope 1/10/2013    1/13 Admitted to [facility] May well have been a seizure. Family observed 3-5 min of shaking of extremities.  Renal artery stenosis ([address] 10/09    [facility] records    Renal artery stenosis ([address]     SBO (small bowel obstruction) ([address]     Seizure ([address] 1/9/2013    Spinal stenosis      Past Surgical History:   Procedure Laterality Date    ABDOMEN SURGERY N/A 03/17/2016    LAPAROSCOPIC CHOLECYSTECTOMY WITH INTRAOPERATIVE    BACK SURGERY      COLONOSCOPY  1/10    Dr. Ozuna Sit; 2 small polyps, adenomas    HYSTERECTOMY      OTHER SURGICAL HISTORY      excision of tender calcified mass    OTHER SURGICAL HISTORY      blood clots     OTHER SURGICAL HISTORY  02/14/11    right femoral popliteal bypass     No family history on file.   Social History     Tobacco Use    Smoking status: Former Smoker     Packs/day: 1.00     Years: Outpatient Medications   Medication Sig Dispense Refill    rivaroxaban (XARELTO) 20 MG TABS tablet Take 1 tablet by mouth daily (with breakfast) 90 tablet 3    predniSONE (DELTASONE) 20 MG tablet Take 2 tablets by mouth daily 14 tablet 0    fluticasone-salmeterol (ADVAIR DISKUS) 250-50 MCG/DOSE AEPB INHALE 1 PUFF INTO THE LUNGS EVERY 12 HOURS 3 Inhaler 3    tiotropium (SPIRIVA) 18 MCG inhalation capsule Inhale 1 capsule into the lungs daily 90 capsule 3    albuterol sulfate HFA (PROAIR HFA) 108 (90 Base) MCG/ACT inhaler INHALE 2 PUFFS INTO THE LUNGS EVERY 4 HOURS AS NEEDED FOR WHEEZING OR SHORTNESS OF BREATH 1 Inhaler 11    KLOR-CON M10 10 MEQ extended release tablet TAKE 1 TABLET BY MOUTH 2 TIMES DAILY 180 tablet 1    mirtazapine (REMERON) 15 MG tablet Take 15 mg by mouth nightly      oxyCODONE (OXYCONTIN) 10 MG extended release tablet Take 10 mg by mouth every 12 hours. Genesis Garcia oxyCODONE-acetaminophen (PERCOCET) 7.5-325 MG per tablet Take 1 tablet by mouth every 6 hours as needed for Pain. Genesis Garcia DULoxetine (CYMBALTA) 30 MG extended release capsule Take 1 capsule by mouth daily 30 capsule 0    Naloxone HCl (EVZIO) 2 MG/0.4ML SOAJ Use as directed 1 Package 0    diltiazem (CARDIZEM CD) 120 MG extended release capsule Take 1 capsule by mouth daily 30 capsule 3    metoprolol succinate (TOPROL XL) 50 MG extended release tablet Take 1 tablet by mouth daily 30 tablet 2    lactobacillus (CULTURELLE) CAPS capsule Take 1 capsule by mouth daily 30 capsule 0    QUEtiapine (SEROQUEL) 25 MG tablet Take 25 mg by mouth nightly as needed (sleep)      lisinopril (PRINIVIL;ZESTRIL) 10 MG tablet Take 1 tablet by mouth daily 90 tablet 3    omeprazole (PRILOSEC) 40 MG delayed release capsule TAKE 1 CAPSULE BY MOUTH DAILY 180 capsule 1    gabapentin (NEURONTIN) 300 MG capsule Take 1 capsule by mouth 2 times daily for 30 days. . 60 capsule 0     No current facility-administered medications for this visit.

## 2019-07-16 NOTE — PROGRESS NOTES
daily 90 capsule 3    albuterol sulfate HFA (PROAIR HFA) 108 (90 Base) MCG/ACT inhaler INHALE 2 PUFFS INTO THE LUNGS EVERY 4 HOURS AS NEEDED FOR WHEEZING OR SHORTNESS OF BREATH 1 Inhaler 11    KLOR-CON M10 10 MEQ extended release tablet TAKE 1 TABLET BY MOUTH 2 TIMES DAILY 180 tablet 1    mirtazapine (REMERON) 15 MG tablet Take 15 mg by mouth nightly      oxyCODONE (OXYCONTIN) 10 MG extended release tablet Take 10 mg by mouth every 12 hours. Cherelle Amezquita oxyCODONE-acetaminophen (PERCOCET) 7.5-325 MG per tablet Take 1 tablet by mouth every 6 hours as needed for Pain. Cherelle Amezquita DULoxetine (CYMBALTA) 30 MG extended release capsule Take 1 capsule by mouth daily 30 capsule 0    Naloxone HCl (EVZIO) 2 MG/0.4ML SOAJ Use as directed 1 Package 0    lactobacillus (CULTURELLE) CAPS capsule Take 1 capsule by mouth daily 30 capsule 0    QUEtiapine (SEROQUEL) 25 MG tablet Take 25 mg by mouth nightly as needed (sleep)      omeprazole (PRILOSEC) 40 MG delayed release capsule TAKE 1 CAPSULE BY MOUTH DAILY 180 capsule 1    gabapentin (NEURONTIN) 300 MG capsule Take 1 capsule by mouth 2 times daily for 30 days. . 60 capsule 0     No current facility-administered medications on file prior to visit.         REVIEW OF SYSTEMS:    CONSTITUTIONAL: Negative for fevers and chills  HEENT: Negative for oropharyngeal exudate, post nasal drip, sinus pain / pressure, nasal congestion, ear pain  RESPIRATORY:  See HPI  CARDIOVASCULAR: Negative for chest pain, palpitations, edema  GASTROINTESTINAL: Negative for nausea, vomiting, diarrhea, constipation and abdominal pain  GENITOURINARY: Negative for dysuria, urinary frequency, urinary hesitancy  HEMATOLOGICAL: Negative for adenopathy  SKIN: Negative for clubbing, cyanosis, skin lesions  ENDOCRINE: Negative for polyuria, polydipsia, heat intolerance, cold intolerance   EXTREMITIES: Negative for weakness, decreased ROM  NEUROLOGICAL: Negative for unilateral weakness, speech or gait but appears normal.      Lung volumes were not completely done due to the patients difficulty with the  testing procedure, but the vital capacity is noted to be normal.      Arterial blood gases done on room air at rest with a pH of 7.45, pCO2 of 34  and a PaO2 of 94 are normal findings. IMPRESSION:    1. No evidence of obstruction to air flow. 2.  Limited volume studies, but no significant evidence of restrictive  disease. 3.  Resting arterial blood gases on room air are normal.     Assessment:      Diagnosis Orders   1. COPD, severity to be determined ([address]     2. History of tobacco use         Plan:     Clinically patient's history is consistent with COPD, which is well controlled at present. Cont Advair, Spiriva and prn albuterol  Patient is not smoking. She is not a candidate for lung cancer screening.   She is up-to-date with Pneumovax and Prevnar 13    Return to clinic in 6 months or sooner if needed

## 2020-01-01 ENCOUNTER — OFFICE VISIT (OUTPATIENT)
Dept: PRIMARY CARE CLINIC | Age: 85
End: 2020-01-01

## 2020-01-01 ENCOUNTER — HOSPITAL ENCOUNTER (OUTPATIENT)
Dept: NON INVASIVE DIAGNOSTICS | Age: 85
Discharge: HOME | End: 2020-05-29
Payer: MEDICARE

## 2020-01-01 ENCOUNTER — TELEPHONE (OUTPATIENT)
Dept: VASCULAR SURGERY | Age: 85
End: 2020-01-01

## 2020-01-01 ENCOUNTER — HOSPITAL ENCOUNTER (OUTPATIENT)
Dept: INTERVENTIONAL RADIOLOGY/VASCULAR | Age: 85
Discharge: HOME | End: 2020-05-20
Attending: SURGERY | Admitting: SURGERY
Payer: MEDICARE

## 2020-01-01 ENCOUNTER — TELEPHONE (OUTPATIENT)
Dept: CARDIOLOGY CLINIC | Age: 85
End: 2020-01-01

## 2020-01-01 ENCOUNTER — HOSPITAL ENCOUNTER (OUTPATIENT)
Dept: VASCULAR LAB | Age: 85
Discharge: HOME | End: 2020-05-05
Payer: MEDICARE

## 2020-01-01 ENCOUNTER — OFFICE VISIT (OUTPATIENT)
Dept: PULMONOLOGY | Age: 85
End: 2020-01-01
Payer: MEDICARE

## 2020-01-01 ENCOUNTER — HOSPITAL ENCOUNTER (OUTPATIENT)
Dept: INTERVENTIONAL RADIOLOGY/VASCULAR | Age: 85
Discharge: HOME | End: 2020-05-27
Attending: SURGERY | Admitting: SURGERY
Payer: MEDICARE

## 2020-01-01 ENCOUNTER — ANESTHESIA EVENT (OUTPATIENT)
Dept: OPERATING ROOM | Age: 85
DRG: 270 | End: 2020-01-01
Payer: MEDICARE

## 2020-01-01 ENCOUNTER — ANESTHESIA (OUTPATIENT)
Dept: OPERATING ROOM | Age: 85
DRG: 270 | End: 2020-01-01
Payer: MEDICARE

## 2020-01-01 ENCOUNTER — PRE-PROCEDURE TELEPHONE (OUTPATIENT)
Dept: CARDIAC CATH/INVASIVE PROCEDURES | Age: 85
End: 2020-01-01

## 2020-01-01 ENCOUNTER — HOSPITAL ENCOUNTER (INPATIENT)
Age: 85
LOS: 1 days | DRG: 270 | End: 2020-06-02
Attending: SURGERY | Admitting: SURGERY
Payer: MEDICARE

## 2020-01-01 ENCOUNTER — OFFICE VISIT (OUTPATIENT)
Dept: VASCULAR SURGERY | Age: 85
End: 2020-01-01
Payer: MEDICARE

## 2020-01-01 VITALS — WEIGHT: 126 LBS | TEMPERATURE: 97.8 F | HEIGHT: 63 IN | BODY MASS INDEX: 22.32 KG/M2

## 2020-01-01 VITALS
RESPIRATION RATE: 18 BRPM | BODY MASS INDEX: 22.5 KG/M2 | WEIGHT: 127 LBS | TEMPERATURE: 97.7 F | SYSTOLIC BLOOD PRESSURE: 133 MMHG | DIASTOLIC BLOOD PRESSURE: 70 MMHG | OXYGEN SATURATION: 79 % | HEART RATE: 121 BPM | HEIGHT: 63 IN

## 2020-01-01 VITALS — WEIGHT: 127 LBS | TEMPERATURE: 98 F | HEIGHT: 63 IN | BODY MASS INDEX: 22.5 KG/M2

## 2020-01-01 VITALS
BODY MASS INDEX: 23.19 KG/M2 | HEART RATE: 69 BPM | WEIGHT: 126 LBS | TEMPERATURE: 98.2 F | SYSTOLIC BLOOD PRESSURE: 140 MMHG | HEIGHT: 62 IN | DIASTOLIC BLOOD PRESSURE: 69 MMHG

## 2020-01-01 VITALS
OXYGEN SATURATION: 93 % | SYSTOLIC BLOOD PRESSURE: 118 MMHG | HEART RATE: 92 BPM | DIASTOLIC BLOOD PRESSURE: 64 MMHG | RESPIRATION RATE: 18 BRPM | WEIGHT: 126 LBS | BODY MASS INDEX: 23.19 KG/M2 | HEIGHT: 62 IN

## 2020-01-01 VITALS
OXYGEN SATURATION: 68 % | RESPIRATION RATE: 10 BRPM | TEMPERATURE: 96.8 F | SYSTOLIC BLOOD PRESSURE: 103 MMHG | DIASTOLIC BLOOD PRESSURE: 68 MMHG

## 2020-01-01 LAB
ABO/RH: NORMAL
ANION GAP SERPL CALCULATED.3IONS-SCNC: 10 MMOL/L (ref 3–16)
ANION GAP SERPL CALCULATED.3IONS-SCNC: 11 MMOL/L (ref 3–16)
ANION GAP SERPL CALCULATED.3IONS-SCNC: 12 MMOL/L (ref 3–16)
ANTIBODY SCREEN: NORMAL
BASE EXCESS ARTERIAL: -10 (ref -3–3)
BUN BLDV-MCNC: 14 MG/DL (ref 7–20)
BUN BLDV-MCNC: 16 MG/DL (ref 7–20)
BUN BLDV-MCNC: 16 MG/DL (ref 7–20)
CALCIUM IONIZED: 1.27 MMOL/L (ref 1.12–1.32)
CALCIUM SERPL-MCNC: 8.3 MG/DL (ref 8.3–10.6)
CALCIUM SERPL-MCNC: 8.6 MG/DL (ref 8.3–10.6)
CALCIUM SERPL-MCNC: 9.1 MG/DL (ref 8.3–10.6)
CHLORIDE BLD-SCNC: 103 MMOL/L (ref 99–110)
CHLORIDE BLD-SCNC: 104 MMOL/L (ref 99–110)
CHLORIDE BLD-SCNC: 105 MMOL/L (ref 99–110)
CO2: 22 MMOL/L (ref 21–32)
CO2: 22 MMOL/L (ref 21–32)
CO2: 23 MMOL/L (ref 21–32)
CREAT SERPL-MCNC: 1.1 MG/DL (ref 0.6–1.2)
CREAT SERPL-MCNC: 1.3 MG/DL (ref 0.6–1.2)
CREAT SERPL-MCNC: 1.3 MG/DL (ref 0.6–1.2)
EKG ATRIAL RATE: 113 BPM
EKG ATRIAL RATE: 170 BPM
EKG DIAGNOSIS: NORMAL
EKG DIAGNOSIS: NORMAL
EKG Q-T INTERVAL: 338 MS
EKG Q-T INTERVAL: 356 MS
EKG QRS DURATION: 70 MS
EKG QRS DURATION: 80 MS
EKG QTC CALCULATION (BAZETT): 430 MS
EKG QTC CALCULATION (BAZETT): 530 MS
EKG R AXIS: -11 DEGREES
EKG R AXIS: -27 DEGREES
EKG T AXIS: 102 DEGREES
EKG T AXIS: 28 DEGREES
EKG VENTRICULAR RATE: 148 BPM
EKG VENTRICULAR RATE: 88 BPM
GFR AFRICAN AMERICAN: 47
GFR AFRICAN AMERICAN: 47
GFR AFRICAN AMERICAN: 57
GFR NON-AFRICAN AMERICAN: 39
GFR NON-AFRICAN AMERICAN: 39
GFR NON-AFRICAN AMERICAN: 47
GLUCOSE BLD-MCNC: 102 MG/DL (ref 70–99)
GLUCOSE BLD-MCNC: 153 MG/DL (ref 70–99)
GLUCOSE BLD-MCNC: 98 MG/DL (ref 70–99)
GLUCOSE BLD-MCNC: 99 MG/DL (ref 70–99)
HCO3 ARTERIAL: 17.5 MMOL/L (ref 21–29)
HCT VFR BLD CALC: 28.7 % (ref 36–48)
HCT VFR BLD CALC: 31.6 % (ref 36–48)
HCT VFR BLD CALC: 32.1 % (ref 36–48)
HEMOGLOBIN: 10.3 G/DL (ref 12–16)
HEMOGLOBIN: 10.5 G/DL (ref 12–16)
HEMOGLOBIN: 9.5 G/DL (ref 12–16)
INR BLD: 1.06 (ref 0.86–1.14)
INR BLD: 1.12 (ref 0.86–1.14)
LV EF: 73 %
LVEF MODALITY: NORMAL
MCH RBC QN AUTO: 31.6 PG (ref 26–34)
MCH RBC QN AUTO: 31.6 PG (ref 26–34)
MCH RBC QN AUTO: 31.8 PG (ref 26–34)
MCHC RBC AUTO-ENTMCNC: 32.7 G/DL (ref 31–36)
MCHC RBC AUTO-ENTMCNC: 32.7 G/DL (ref 31–36)
MCHC RBC AUTO-ENTMCNC: 32.9 G/DL (ref 31–36)
MCV RBC AUTO: 95.9 FL (ref 80–100)
MCV RBC AUTO: 96.7 FL (ref 80–100)
MCV RBC AUTO: 97.4 FL (ref 80–100)
O2 SAT, ARTERIAL: 100 % (ref 93–100)
PCO2 ARTERIAL: 41 MM HG (ref 35–45)
PDW BLD-RTO: 14.3 % (ref 12.4–15.4)
PDW BLD-RTO: 14.5 % (ref 12.4–15.4)
PDW BLD-RTO: 14.6 % (ref 12.4–15.4)
PERFORMED ON: ABNORMAL
PH ARTERIAL: 7.24 (ref 7.35–7.45)
PLATELET # BLD: 249 K/UL (ref 135–450)
PLATELET # BLD: 284 K/UL (ref 135–450)
PLATELET # BLD: 325 K/UL (ref 135–450)
PMV BLD AUTO: 6.8 FL (ref 5–10.5)
PMV BLD AUTO: 6.8 FL (ref 5–10.5)
PMV BLD AUTO: 7 FL (ref 5–10.5)
PO2 ARTERIAL: 221.1 MM HG (ref 75–108)
POC ACT LR: 116 SEC
POC ACT LR: 267 SEC
POC ACT LR: 305 SEC
POC POTASSIUM: 3.3 MMOL/L (ref 3.5–5.1)
POC SAMPLE TYPE: ABNORMAL
POC SODIUM: 144 MMOL/L (ref 136–145)
POTASSIUM SERPL-SCNC: 4.2 MMOL/L (ref 3.5–5.1)
POTASSIUM SERPL-SCNC: 4.3 MMOL/L (ref 3.5–5.1)
POTASSIUM SERPL-SCNC: 4.4 MMOL/L (ref 3.5–5.1)
PROTHROMBIN TIME: 12.3 SEC (ref 10–13.2)
PROTHROMBIN TIME: 13 SEC (ref 10–13.2)
RBC # BLD: 3 M/UL (ref 4–5.2)
RBC # BLD: 3.24 M/UL (ref 4–5.2)
RBC # BLD: 3.32 M/UL (ref 4–5.2)
SARS-COV-2: NOT DETECTED
SODIUM BLD-SCNC: 137 MMOL/L (ref 136–145)
SODIUM BLD-SCNC: 137 MMOL/L (ref 136–145)
SODIUM BLD-SCNC: 138 MMOL/L (ref 136–145)
SOURCE: NORMAL
TCO2 ARTERIAL: 19 MMOL/L
WBC # BLD: 4.3 K/UL (ref 4–11)
WBC # BLD: 4.9 K/UL (ref 4–11)
WBC # BLD: 5.6 K/UL (ref 4–11)

## 2020-01-01 PROCEDURE — 93925 LOWER EXTREMITY STUDY: CPT

## 2020-01-01 PROCEDURE — 75710 ARTERY X-RAYS ARM/LEG: CPT

## 2020-01-01 PROCEDURE — 027034Z DILATION OF CORONARY ARTERY, ONE ARTERY WITH DRUG-ELUTING INTRALUMINAL DEVICE, PERCUTANEOUS APPROACH: ICD-10-PCS | Performed by: INTERNAL MEDICINE

## 2020-01-01 PROCEDURE — C1751 CATH, INF, PER/CENT/MIDLINE: HCPCS | Performed by: SURGERY

## 2020-01-01 PROCEDURE — 86850 RBC ANTIBODY SCREEN: CPT

## 2020-01-01 PROCEDURE — 6360000002 HC RX W HCPCS: Performed by: INTERNAL MEDICINE

## 2020-01-01 PROCEDURE — 2580000003 HC RX 258: Performed by: SURGERY

## 2020-01-01 PROCEDURE — 93308 TTE F-UP OR LMTD: CPT

## 2020-01-01 PROCEDURE — 3700000001 HC ADD 15 MINUTES (ANESTHESIA): Performed by: SURGERY

## 2020-01-01 PROCEDURE — 93458 L HRT ARTERY/VENTRICLE ANGIO: CPT

## 2020-01-01 PROCEDURE — 3E03317 INTRODUCTION OF OTHER THROMBOLYTIC INTO PERIPHERAL VEIN, PERCUTANEOUS APPROACH: ICD-10-PCS | Performed by: INTERNAL MEDICINE

## 2020-01-01 PROCEDURE — 2580000003 HC RX 258: Performed by: NURSE ANESTHETIST, CERTIFIED REGISTERED

## 2020-01-01 PROCEDURE — 1090F PRES/ABSN URINE INCON ASSESS: CPT | Performed by: INTERNAL MEDICINE

## 2020-01-01 PROCEDURE — 6360000002 HC RX W HCPCS: Performed by: ANESTHESIOLOGY

## 2020-01-01 PROCEDURE — 78452 HT MUSCLE IMAGE SPECT MULT: CPT

## 2020-01-01 PROCEDURE — 75774 ARTERY X-RAY EACH VESSEL: CPT

## 2020-01-01 PROCEDURE — G8400 PT W/DXA NO RESULTS DOC: HCPCS | Performed by: INTERNAL MEDICINE

## 2020-01-01 PROCEDURE — C1769 GUIDE WIRE: HCPCS

## 2020-01-01 PROCEDURE — 1123F ACP DISCUSS/DSCN MKR DOCD: CPT | Performed by: SURGERY

## 2020-01-01 PROCEDURE — 34705 EVAC RPR A-BIILIAC NDGFT: CPT | Performed by: SURGERY

## 2020-01-01 PROCEDURE — 1036F TOBACCO NON-USER: CPT | Performed by: SURGERY

## 2020-01-01 PROCEDURE — 37224 HC FEM POP TERRITORY PLASTY: CPT

## 2020-01-01 PROCEDURE — 2580000003 HC RX 258: Performed by: ANESTHESIOLOGY

## 2020-01-01 PROCEDURE — 33967 INSERT I-AORT PERCUT DEVICE: CPT | Performed by: INTERNAL MEDICINE

## 2020-01-01 PROCEDURE — 99153 MOD SED SAME PHYS/QHP EA: CPT

## 2020-01-01 PROCEDURE — 85027 COMPLETE CBC AUTOMATED: CPT

## 2020-01-01 PROCEDURE — 75710 ARTERY X-RAYS ARM/LEG: CPT | Performed by: SURGERY

## 2020-01-01 PROCEDURE — 92928 PRQ TCAT PLMT NTRAC ST 1 LES: CPT | Performed by: INTERNAL MEDICINE

## 2020-01-01 PROCEDURE — C9606 PERC D-E COR REVASC W AMI S: HCPCS

## 2020-01-01 PROCEDURE — 33967 INSERT I-AORT PERCUT DEVICE: CPT

## 2020-01-01 PROCEDURE — 37224 PR REVSC OPN/PRG FEM/POP W/ANGIOPLASTY UNI: CPT | Performed by: SURGERY

## 2020-01-01 PROCEDURE — 04HL03Z INSERTION OF INFUSION DEVICE INTO LEFT FEMORAL ARTERY, OPEN APPROACH: ICD-10-PCS | Performed by: SURGERY

## 2020-01-01 PROCEDURE — 93010 ELECTROCARDIOGRAM REPORT: CPT | Performed by: INTERNAL MEDICINE

## 2020-01-01 PROCEDURE — 3700000000 HC ANESTHESIA ATTENDED CARE: Performed by: SURGERY

## 2020-01-01 PROCEDURE — 2700000000 HC OXYGEN THERAPY PER DAY

## 2020-01-01 PROCEDURE — 6360000002 HC RX W HCPCS: Performed by: STUDENT IN AN ORGANIZED HEALTH CARE EDUCATION/TRAINING PROGRAM

## 2020-01-01 PROCEDURE — 84132 ASSAY OF SERUM POTASSIUM: CPT

## 2020-01-01 PROCEDURE — C1725 CATH, TRANSLUMIN NON-LASER: HCPCS

## 2020-01-01 PROCEDURE — 4A023N7 MEASUREMENT OF CARDIAC SAMPLING AND PRESSURE, LEFT HEART, PERCUTANEOUS APPROACH: ICD-10-PCS | Performed by: INTERNAL MEDICINE

## 2020-01-01 PROCEDURE — 82330 ASSAY OF CALCIUM: CPT

## 2020-01-01 PROCEDURE — 4040F PNEUMOC VAC/ADMIN/RCVD: CPT | Performed by: SURGERY

## 2020-01-01 PROCEDURE — 2709999900 HC NON-CHARGEABLE SUPPLY

## 2020-01-01 PROCEDURE — 80048 BASIC METABOLIC PNL TOTAL CA: CPT

## 2020-01-01 PROCEDURE — 5A02210 ASSISTANCE WITH CARDIAC OUTPUT USING BALLOON PUMP, CONTINUOUS: ICD-10-PCS | Performed by: INTERNAL MEDICINE

## 2020-01-01 PROCEDURE — C1887 CATHETER, GUIDING: HCPCS

## 2020-01-01 PROCEDURE — C1760 CLOSURE DEV, VASC: HCPCS

## 2020-01-01 PROCEDURE — 93454 CORONARY ARTERY ANGIO S&I: CPT

## 2020-01-01 PROCEDURE — 85610 PROTHROMBIN TIME: CPT

## 2020-01-01 PROCEDURE — 6360000002 HC RX W HCPCS: Performed by: SURGERY

## 2020-01-01 PROCEDURE — G8427 DOCREV CUR MEDS BY ELIG CLIN: HCPCS | Performed by: INTERNAL MEDICINE

## 2020-01-01 PROCEDURE — 3600000004 HC SURGERY LEVEL 4 BASE: Performed by: SURGERY

## 2020-01-01 PROCEDURE — C1887 CATHETER, GUIDING: HCPCS | Performed by: SURGERY

## 2020-01-01 PROCEDURE — 6360000004 HC RX CONTRAST MEDICATION: Performed by: SURGERY

## 2020-01-01 PROCEDURE — 84295 ASSAY OF SERUM SODIUM: CPT

## 2020-01-01 PROCEDURE — B2151ZZ FLUOROSCOPY OF LEFT HEART USING LOW OSMOLAR CONTRAST: ICD-10-PCS | Performed by: INTERNAL MEDICINE

## 2020-01-01 PROCEDURE — 93325 DOPPLER ECHO COLOR FLOW MAPG: CPT

## 2020-01-01 PROCEDURE — 99291 CRITICAL CARE FIRST HOUR: CPT | Performed by: INTERNAL MEDICINE

## 2020-01-01 PROCEDURE — 3600000014 HC SURGERY LEVEL 4 ADDTL 15MIN: Performed by: SURGERY

## 2020-01-01 PROCEDURE — 3430000000 HC RX DIAGNOSTIC RADIOPHARMACEUTICAL: Performed by: INTERNAL MEDICINE

## 2020-01-01 PROCEDURE — C1894 INTRO/SHEATH, NON-LASER: HCPCS

## 2020-01-01 PROCEDURE — C1769 GUIDE WIRE: HCPCS | Performed by: SURGERY

## 2020-01-01 PROCEDURE — 2500000003 HC RX 250 WO HCPCS: Performed by: STUDENT IN AN ORGANIZED HEALTH CARE EDUCATION/TRAINING PROGRAM

## 2020-01-01 PROCEDURE — 36247 INS CATH ABD/L-EXT ART 3RD: CPT | Performed by: SURGERY

## 2020-01-01 PROCEDURE — 2580000003 HC RX 258: Performed by: INTERNAL MEDICINE

## 2020-01-01 PROCEDURE — 2709999900 HC NON-CHARGEABLE SUPPLY: Performed by: SURGERY

## 2020-01-01 PROCEDURE — G8482 FLU IMMUNIZE ORDER/ADMIN: HCPCS | Performed by: INTERNAL MEDICINE

## 2020-01-01 PROCEDURE — 4040F PNEUMOC VAC/ADMIN/RCVD: CPT | Performed by: INTERNAL MEDICINE

## 2020-01-01 PROCEDURE — 3023F SPIROM DOC REV: CPT | Performed by: INTERNAL MEDICINE

## 2020-01-01 PROCEDURE — 85347 COAGULATION TIME ACTIVATED: CPT

## 2020-01-01 PROCEDURE — 99152 MOD SED SAME PHYS/QHP 5/>YRS: CPT

## 2020-01-01 PROCEDURE — G8420 CALC BMI NORM PARAMETERS: HCPCS | Performed by: SURGERY

## 2020-01-01 PROCEDURE — 1123F ACP DISCUSS/DSCN MKR DOCD: CPT | Performed by: INTERNAL MEDICINE

## 2020-01-01 PROCEDURE — 99152 MOD SED SAME PHYS/QHP 5/>YRS: CPT | Performed by: SURGERY

## 2020-01-01 PROCEDURE — 94640 AIRWAY INHALATION TREATMENT: CPT

## 2020-01-01 PROCEDURE — C1894 INTRO/SHEATH, NON-LASER: HCPCS | Performed by: SURGERY

## 2020-01-01 PROCEDURE — 99214 OFFICE O/P EST MOD 30 MIN: CPT | Performed by: INTERNAL MEDICINE

## 2020-01-01 PROCEDURE — 2000000000 HC ICU R&B

## 2020-01-01 PROCEDURE — 86900 BLOOD TYPING SEROLOGIC ABO: CPT

## 2020-01-01 PROCEDURE — 1090F PRES/ABSN URINE INCON ASSESS: CPT | Performed by: SURGERY

## 2020-01-01 PROCEDURE — 04HK03Z INSERTION OF INFUSION DEVICE INTO RIGHT FEMORAL ARTERY, OPEN APPROACH: ICD-10-PCS | Performed by: SURGERY

## 2020-01-01 PROCEDURE — 93005 ELECTROCARDIOGRAM TRACING: CPT | Performed by: ANESTHESIOLOGY

## 2020-01-01 PROCEDURE — C1874 STENT, COATED/COV W/DEL SYS: HCPCS

## 2020-01-01 PROCEDURE — 93017 CV STRESS TEST TRACING ONLY: CPT

## 2020-01-01 PROCEDURE — 82947 ASSAY GLUCOSE BLOOD QUANT: CPT

## 2020-01-01 PROCEDURE — C1725 CATH, TRANSLUMIN NON-LASER: HCPCS | Performed by: SURGERY

## 2020-01-01 PROCEDURE — A9502 TC99M TETROFOSMIN: HCPCS | Performed by: INTERNAL MEDICINE

## 2020-01-01 PROCEDURE — 82803 BLOOD GASES ANY COMBINATION: CPT

## 2020-01-01 PROCEDURE — 99214 OFFICE O/P EST MOD 30 MIN: CPT | Performed by: SURGERY

## 2020-01-01 PROCEDURE — 86901 BLOOD TYPING SEROLOGIC RH(D): CPT

## 2020-01-01 PROCEDURE — 1036F TOBACCO NON-USER: CPT | Performed by: INTERNAL MEDICINE

## 2020-01-01 PROCEDURE — 93458 L HRT ARTERY/VENTRICLE ANGIO: CPT | Performed by: INTERNAL MEDICINE

## 2020-01-01 PROCEDURE — 94770 HC ETCO2 MONITOR DAILY: CPT

## 2020-01-01 PROCEDURE — G8400 PT W/DXA NO RESULTS DOC: HCPCS | Performed by: SURGERY

## 2020-01-01 PROCEDURE — 6360000002 HC RX W HCPCS: Performed by: NURSE ANESTHETIST, CERTIFIED REGISTERED

## 2020-01-01 PROCEDURE — G8420 CALC BMI NORM PARAMETERS: HCPCS | Performed by: INTERNAL MEDICINE

## 2020-01-01 PROCEDURE — 93005 ELECTROCARDIOGRAM TRACING: CPT | Performed by: SURGERY

## 2020-01-01 PROCEDURE — 36247 INS CATH ABD/L-EXT ART 3RD: CPT

## 2020-01-01 PROCEDURE — G8926 SPIRO NO PERF OR DOC: HCPCS | Performed by: INTERNAL MEDICINE

## 2020-01-01 PROCEDURE — C9600 PERC DRUG-EL COR STENT SING: HCPCS

## 2020-01-01 PROCEDURE — 86923 COMPATIBILITY TEST ELECTRIC: CPT

## 2020-01-01 PROCEDURE — 36248 INS CATH ABD/L-EXT ART ADDL: CPT

## 2020-01-01 PROCEDURE — 2500000003 HC RX 250 WO HCPCS: Performed by: NURSE ANESTHETIST, CERTIFIED REGISTERED

## 2020-01-01 PROCEDURE — 36200 PLACE CATHETER IN AORTA: CPT | Performed by: SURGERY

## 2020-01-01 PROCEDURE — 2500000003 HC RX 250 WO HCPCS: Performed by: ANESTHESIOLOGY

## 2020-01-01 PROCEDURE — G8427 DOCREV CUR MEDS BY ELIG CLIN: HCPCS | Performed by: SURGERY

## 2020-01-01 PROCEDURE — B2111ZZ FLUOROSCOPY OF MULTIPLE CORONARY ARTERIES USING LOW OSMOLAR CONTRAST: ICD-10-PCS | Performed by: INTERNAL MEDICINE

## 2020-01-01 PROCEDURE — 94761 N-INVAS EAR/PLS OXIMETRY MLT: CPT

## 2020-01-01 RX ORDER — ROCURONIUM BROMIDE 10 MG/ML
INJECTION, SOLUTION INTRAVENOUS PRN
Status: DISCONTINUED | OUTPATIENT
Start: 2020-01-01 | End: 2020-01-01 | Stop reason: SDUPTHER

## 2020-01-01 RX ORDER — SODIUM CHLORIDE, SODIUM LACTATE, POTASSIUM CHLORIDE, CALCIUM CHLORIDE 600; 310; 30; 20 MG/100ML; MG/100ML; MG/100ML; MG/100ML
INJECTION, SOLUTION INTRAVENOUS CONTINUOUS PRN
Status: DISCONTINUED | OUTPATIENT
Start: 2020-01-01 | End: 2020-01-01 | Stop reason: SDUPTHER

## 2020-01-01 RX ORDER — EPINEPHRINE 0.1 MG/ML
SYRINGE (ML) INJECTION
Status: COMPLETED | OUTPATIENT
Start: 2020-01-01 | End: 2020-01-01

## 2020-01-01 RX ORDER — SODIUM CHLORIDE 9 MG/ML
INJECTION, SOLUTION INTRAVENOUS CONTINUOUS
Status: ACTIVE | OUTPATIENT
Start: 2020-01-01 | End: 2020-01-01

## 2020-01-01 RX ORDER — CALCIUM CHLORIDE 100 MG/ML
INJECTION INTRAVENOUS; INTRAVENTRICULAR
Status: COMPLETED | OUTPATIENT
Start: 2020-01-01 | End: 2020-01-01

## 2020-01-01 RX ORDER — OXYCODONE HYDROCHLORIDE AND ACETAMINOPHEN 5; 325 MG/1; MG/1
2 TABLET ORAL PRN
Status: DISCONTINUED | OUTPATIENT
Start: 2020-01-01 | End: 2020-01-01 | Stop reason: HOSPADM

## 2020-01-01 RX ORDER — MORPHINE SULFATE 2 MG/ML
2 INJECTION, SOLUTION INTRAMUSCULAR; INTRAVENOUS
Status: DISCONTINUED | OUTPATIENT
Start: 2020-01-01 | End: 2020-01-01 | Stop reason: HOSPADM

## 2020-01-01 RX ORDER — PROPOFOL 10 MG/ML
INJECTION, EMULSION INTRAVENOUS PRN
Status: DISCONTINUED | OUTPATIENT
Start: 2020-01-01 | End: 2020-01-01 | Stop reason: SDUPTHER

## 2020-01-01 RX ORDER — ESMOLOL HYDROCHLORIDE 10 MG/ML
INJECTION INTRAVENOUS PRN
Status: DISCONTINUED | OUTPATIENT
Start: 2020-01-01 | End: 2020-01-01 | Stop reason: SDUPTHER

## 2020-01-01 RX ORDER — ESMOLOL HYDROCHLORIDE 10 MG/ML
50 INJECTION, SOLUTION INTRAVENOUS CONTINUOUS
Status: DISCONTINUED | OUTPATIENT
Start: 2020-01-01 | End: 2020-01-01 | Stop reason: HOSPADM

## 2020-01-01 RX ORDER — OXYCODONE AND ACETAMINOPHEN 7.5; 325 MG/1; MG/1
1 TABLET ORAL ONCE
Status: DISCONTINUED | OUTPATIENT
Start: 2020-01-01 | End: 2020-01-01 | Stop reason: HOSPADM

## 2020-01-01 RX ORDER — EPINEPHRINE 1 MG/ML
INJECTION, SOLUTION, CONCENTRATE INTRAVENOUS PRN
Status: DISCONTINUED | OUTPATIENT
Start: 2020-01-01 | End: 2020-01-01 | Stop reason: SDUPTHER

## 2020-01-01 RX ORDER — SODIUM CHLORIDE 0.9 % (FLUSH) 0.9 %
10 SYRINGE (ML) INJECTION PRN
Status: DISCONTINUED | OUTPATIENT
Start: 2020-01-01 | End: 2020-01-01 | Stop reason: HOSPADM

## 2020-01-01 RX ORDER — ONDANSETRON 2 MG/ML
4 INJECTION INTRAMUSCULAR; INTRAVENOUS
Status: DISCONTINUED | OUTPATIENT
Start: 2020-01-01 | End: 2020-01-01 | Stop reason: HOSPADM

## 2020-01-01 RX ORDER — FENTANYL CITRATE 50 UG/ML
50 INJECTION, SOLUTION INTRAMUSCULAR; INTRAVENOUS EVERY 5 MIN PRN
Status: DISCONTINUED | OUTPATIENT
Start: 2020-01-01 | End: 2020-01-01 | Stop reason: HOSPADM

## 2020-01-01 RX ORDER — METHYLPREDNISOLONE SODIUM SUCCINATE 125 MG/2ML
100 INJECTION, POWDER, LYOPHILIZED, FOR SOLUTION INTRAMUSCULAR; INTRAVENOUS DAILY
Status: DISCONTINUED | OUTPATIENT
Start: 2020-01-01 | End: 2020-01-01 | Stop reason: HOSPADM

## 2020-01-01 RX ORDER — CALCIUM CHLORIDE 100 MG/ML
INJECTION INTRAVENOUS; INTRAVENTRICULAR PRN
Status: DISCONTINUED | OUTPATIENT
Start: 2020-01-01 | End: 2020-01-01 | Stop reason: SDUPTHER

## 2020-01-01 RX ORDER — MORPHINE SULFATE 4 MG/ML
4 INJECTION, SOLUTION INTRAMUSCULAR; INTRAVENOUS
Status: DISCONTINUED | OUTPATIENT
Start: 2020-01-01 | End: 2020-01-01 | Stop reason: HOSPADM

## 2020-01-01 RX ORDER — CLONIDINE HYDROCHLORIDE 0.1 MG/1
0.1 TABLET ORAL
Status: DISCONTINUED | OUTPATIENT
Start: 2020-01-01 | End: 2020-01-01 | Stop reason: HOSPADM

## 2020-01-01 RX ORDER — HYDRALAZINE HYDROCHLORIDE 20 MG/ML
5 INJECTION INTRAMUSCULAR; INTRAVENOUS EVERY 10 MIN PRN
Status: DISCONTINUED | OUTPATIENT
Start: 2020-01-01 | End: 2020-01-01 | Stop reason: HOSPADM

## 2020-01-01 RX ORDER — SODIUM CHLORIDE 0.9 % (FLUSH) 0.9 %
10 SYRINGE (ML) INJECTION EVERY 12 HOURS SCHEDULED
Status: DISCONTINUED | OUTPATIENT
Start: 2020-01-01 | End: 2020-01-01 | Stop reason: HOSPADM

## 2020-01-01 RX ORDER — LABETALOL 20 MG/4 ML (5 MG/ML) INTRAVENOUS SYRINGE
5 EVERY 10 MIN PRN
Status: DISCONTINUED | OUTPATIENT
Start: 2020-01-01 | End: 2020-01-01 | Stop reason: HOSPADM

## 2020-01-01 RX ORDER — IODIXANOL 270 MG/ML
100 INJECTION, SOLUTION INTRAVASCULAR
Status: COMPLETED | OUTPATIENT
Start: 2020-01-01 | End: 2020-01-01

## 2020-01-01 RX ORDER — NITROGLYCERIN 5 MG/ML
INJECTION, SOLUTION INTRAVENOUS PRN
Status: DISCONTINUED | OUTPATIENT
Start: 2020-01-01 | End: 2020-01-01 | Stop reason: SDUPTHER

## 2020-01-01 RX ORDER — ALBUTEROL SULFATE 2.5 MG/3ML
2.5 SOLUTION RESPIRATORY (INHALATION) ONCE
Status: COMPLETED | OUTPATIENT
Start: 2020-01-01 | End: 2020-01-01

## 2020-01-01 RX ORDER — OXYCODONE HYDROCHLORIDE AND ACETAMINOPHEN 5; 325 MG/1; MG/1
1 TABLET ORAL PRN
Status: DISCONTINUED | OUTPATIENT
Start: 2020-01-01 | End: 2020-01-01 | Stop reason: HOSPADM

## 2020-01-01 RX ORDER — FENTANYL CITRATE 50 UG/ML
25 INJECTION, SOLUTION INTRAMUSCULAR; INTRAVENOUS EVERY 5 MIN PRN
Status: DISCONTINUED | OUTPATIENT
Start: 2020-01-01 | End: 2020-01-01 | Stop reason: HOSPADM

## 2020-01-01 RX ORDER — OXYCODONE HYDROCHLORIDE AND ACETAMINOPHEN 5; 325 MG/1; MG/1
2 TABLET ORAL EVERY 4 HOURS PRN
Status: DISCONTINUED | OUTPATIENT
Start: 2020-01-01 | End: 2020-01-01 | Stop reason: HOSPADM

## 2020-01-01 RX ORDER — HEPARIN SODIUM 1000 [USP'U]/ML
INJECTION, SOLUTION INTRAVENOUS; SUBCUTANEOUS PRN
Status: DISCONTINUED | OUTPATIENT
Start: 2020-01-01 | End: 2020-01-01 | Stop reason: SDUPTHER

## 2020-01-01 RX ORDER — ACETAMINOPHEN 325 MG/1
650 TABLET ORAL PRN
COMMUNITY
Start: 2020-01-01

## 2020-01-01 RX ORDER — SODIUM CHLORIDE, SODIUM LACTATE, POTASSIUM CHLORIDE, CALCIUM CHLORIDE 600; 310; 30; 20 MG/100ML; MG/100ML; MG/100ML; MG/100ML
INJECTION, SOLUTION INTRAVENOUS CONTINUOUS
Status: DISCONTINUED | OUTPATIENT
Start: 2020-01-01 | End: 2020-01-01 | Stop reason: HOSPADM

## 2020-01-01 RX ORDER — METOPROLOL TARTRATE 5 MG/5ML
INJECTION INTRAVENOUS PRN
Status: DISCONTINUED | OUTPATIENT
Start: 2020-01-01 | End: 2020-01-01 | Stop reason: SDUPTHER

## 2020-01-01 RX ORDER — DEXAMETHASONE SODIUM PHOSPHATE 4 MG/ML
INJECTION, SOLUTION INTRA-ARTICULAR; INTRALESIONAL; INTRAMUSCULAR; INTRAVENOUS; SOFT TISSUE PRN
Status: DISCONTINUED | OUTPATIENT
Start: 2020-01-01 | End: 2020-01-01 | Stop reason: SDUPTHER

## 2020-01-01 RX ORDER — ONDANSETRON 2 MG/ML
INJECTION INTRAMUSCULAR; INTRAVENOUS PRN
Status: DISCONTINUED | OUTPATIENT
Start: 2020-01-01 | End: 2020-01-01 | Stop reason: SDUPTHER

## 2020-01-01 RX ORDER — EPINEPHRINE 1 MG/ML
INJECTION PARENTERAL PRN
Status: DISCONTINUED | OUTPATIENT
Start: 2020-01-01 | End: 2020-01-01 | Stop reason: SDUPTHER

## 2020-01-01 RX ORDER — ACETAMINOPHEN 325 MG/1
650 TABLET ORAL EVERY 4 HOURS PRN
Status: DISCONTINUED | OUTPATIENT
Start: 2020-01-01 | End: 2020-01-01 | Stop reason: HOSPADM

## 2020-01-01 RX ORDER — RIVAROXABAN 20 MG/1
TABLET, FILM COATED ORAL
Qty: 90 TABLET | Refills: 2 | Status: SHIPPED | OUTPATIENT
Start: 2020-01-01

## 2020-01-01 RX ORDER — LIDOCAINE HYDROCHLORIDE 10 MG/ML
INJECTION, SOLUTION INFILTRATION; PERINEURAL PRN
Status: DISCONTINUED | OUTPATIENT
Start: 2020-01-01 | End: 2020-01-01 | Stop reason: SDUPTHER

## 2020-01-01 RX ORDER — OXYCODONE HYDROCHLORIDE AND ACETAMINOPHEN 5; 325 MG/1; MG/1
1 TABLET ORAL EVERY 4 HOURS PRN
Status: DISCONTINUED | OUTPATIENT
Start: 2020-01-01 | End: 2020-01-01 | Stop reason: HOSPADM

## 2020-01-01 RX ORDER — LABETALOL 20 MG/4 ML (5 MG/ML) INTRAVENOUS SYRINGE
10
Status: DISCONTINUED | OUTPATIENT
Start: 2020-01-01 | End: 2020-01-01 | Stop reason: HOSPADM

## 2020-01-01 RX ORDER — EPHEDRINE SULFATE 50 MG/ML
INJECTION INTRAVENOUS PRN
Status: DISCONTINUED | OUTPATIENT
Start: 2020-01-01 | End: 2020-01-01 | Stop reason: SDUPTHER

## 2020-01-01 RX ADMIN — EPINEPHRINE 40 MCG: 1 INJECTION PARENTERAL at 11:00

## 2020-01-01 RX ADMIN — PROPOFOL 100 MG: 10 INJECTION, EMULSION INTRAVENOUS at 09:08

## 2020-01-01 RX ADMIN — EPINEPHRINE 10 MCG: 1 INJECTION PARENTERAL at 11:19

## 2020-01-01 RX ADMIN — EPINEPHRINE 1 MG: 0.1 INJECTION, SOLUTION ENDOTRACHEAL; INTRACARDIAC; INTRAVENOUS at 13:23

## 2020-01-01 RX ADMIN — METHYLPREDNISOLONE SODIUM SUCCINATE 100 MG: 125 INJECTION, POWDER, FOR SOLUTION INTRAMUSCULAR; INTRAVENOUS at 10:48

## 2020-01-01 RX ADMIN — EPINEPHRINE 1 MG: 0.1 INJECTION, SOLUTION ENDOTRACHEAL; INTRACARDIAC; INTRAVENOUS at 13:04

## 2020-01-01 RX ADMIN — TETROFOSMIN 10.8 MILLICURIE: 1.38 INJECTION, POWDER, LYOPHILIZED, FOR SOLUTION INTRAVENOUS at 12:27

## 2020-01-01 RX ADMIN — ESMOLOL HYDROCHLORIDE 15 MG: 10 INJECTION, SOLUTION INTRAVENOUS at 10:30

## 2020-01-01 RX ADMIN — SODIUM CHLORIDE, POTASSIUM CHLORIDE, SODIUM LACTATE AND CALCIUM CHLORIDE: 600; 310; 30; 20 INJECTION, SOLUTION INTRAVENOUS at 08:50

## 2020-01-01 RX ADMIN — SODIUM BICARBONATE 100 MEQ: 84 INJECTION, SOLUTION INTRAVENOUS at 11:36

## 2020-01-01 RX ADMIN — ROCURONIUM BROMIDE 70 MG: 10 INJECTION, SOLUTION INTRAVENOUS at 09:08

## 2020-01-01 RX ADMIN — EPINEPHRINE 100 MCG: 1 INJECTION PARENTERAL at 11:36

## 2020-01-01 RX ADMIN — EPINEPHRINE 200 MCG: 1 INJECTION PARENTERAL at 11:47

## 2020-01-01 RX ADMIN — EPINEPHRINE 1 MG: 0.1 INJECTION, SOLUTION ENDOTRACHEAL; INTRACARDIAC; INTRAVENOUS at 13:10

## 2020-01-01 RX ADMIN — Medication 10 ML: at 12:27

## 2020-01-01 RX ADMIN — EPINEPHRINE 1 MG: 0.1 INJECTION, SOLUTION ENDOTRACHEAL; INTRACARDIAC; INTRAVENOUS at 13:26

## 2020-01-01 RX ADMIN — EPINEPHRINE 10 MCG/MIN: 1 INJECTION, SOLUTION INTRAMUSCULAR; INTRAVENOUS; SUBCUTANEOUS at 11:43

## 2020-01-01 RX ADMIN — REGADENOSON 0.4 MG: 0.08 INJECTION, SOLUTION INTRAVENOUS at 13:34

## 2020-01-01 RX ADMIN — EPINEPHRINE 100 MCG: 1 INJECTION PARENTERAL at 11:34

## 2020-01-01 RX ADMIN — IODIXANOL 100 ML: 270 INJECTION, SOLUTION INTRAVASCULAR at 13:41

## 2020-01-01 RX ADMIN — Medication 10 ML: at 13:34

## 2020-01-01 RX ADMIN — PHENYLEPHRINE HYDROCHLORIDE 20 MCG/MIN: 10 INJECTION, SOLUTION INTRAMUSCULAR; INTRAVENOUS; SUBCUTANEOUS at 09:42

## 2020-01-01 RX ADMIN — NITROGLYCERIN 100 MCG: 5 INJECTION, SOLUTION INTRAVENOUS at 10:28

## 2020-01-01 RX ADMIN — PHENYLEPHRINE HYDROCHLORIDE 30 MCG/MIN: 10 INJECTION, SOLUTION INTRAMUSCULAR; INTRAVENOUS; SUBCUTANEOUS at 10:39

## 2020-01-01 RX ADMIN — EPINEPHRINE 1 MG: 0.1 INJECTION, SOLUTION ENDOTRACHEAL; INTRACARDIAC; INTRAVENOUS at 13:16

## 2020-01-01 RX ADMIN — ONDANSETRON 4 MG: 2 INJECTION INTRAMUSCULAR; INTRAVENOUS at 09:00

## 2020-01-01 RX ADMIN — EPINEPHRINE 100 MCG: 1 INJECTION PARENTERAL at 12:40

## 2020-01-01 RX ADMIN — TETROFOSMIN 36 MILLICURIE: 1.38 INJECTION, POWDER, LYOPHILIZED, FOR SOLUTION INTRAVENOUS at 13:34

## 2020-01-01 RX ADMIN — EPINEPHRINE 1000 MCG: 1 INJECTION PARENTERAL at 11:52

## 2020-01-01 RX ADMIN — SODIUM BICARBONATE 50 MEQ: 84 INJECTION, SOLUTION INTRAVENOUS at 13:10

## 2020-01-01 RX ADMIN — LIDOCAINE HYDROCHLORIDE 50 MG: 10 INJECTION, SOLUTION INFILTRATION; PERINEURAL at 09:08

## 2020-01-01 RX ADMIN — CALCIUM CHLORIDE 0.5 G: 100 INJECTION, SOLUTION INTRAVENOUS at 10:43

## 2020-01-01 RX ADMIN — METOPROLOL TARTRATE 1 MG: 5 INJECTION, SOLUTION INTRAVENOUS at 09:20

## 2020-01-01 RX ADMIN — ESMOLOL HYDROCHLORIDE 10 MG: 10 INJECTION, SOLUTION INTRAVENOUS at 10:36

## 2020-01-01 RX ADMIN — HEPARIN SODIUM 3000 UNITS: 1000 INJECTION, SOLUTION INTRAVENOUS; SUBCUTANEOUS at 11:27

## 2020-01-01 RX ADMIN — NITROGLYCERIN 100 MCG: 5 INJECTION, SOLUTION INTRAVENOUS at 10:30

## 2020-01-01 RX ADMIN — SODIUM CHLORIDE, POTASSIUM CHLORIDE, SODIUM LACTATE AND CALCIUM CHLORIDE: 600; 310; 30; 20 INJECTION, SOLUTION INTRAVENOUS at 08:56

## 2020-01-01 RX ADMIN — CALCIUM CHLORIDE 1 G: 100 INJECTION, SOLUTION INTRAVENOUS at 11:52

## 2020-01-01 RX ADMIN — SODIUM CHLORIDE, SODIUM LACTATE, POTASSIUM CHLORIDE, AND CALCIUM CHLORIDE: 600; 310; 30; 20 INJECTION, SOLUTION INTRAVENOUS at 08:24

## 2020-01-01 RX ADMIN — SODIUM BICARBONATE 50 MEQ: 84 INJECTION, SOLUTION INTRAVENOUS at 13:18

## 2020-01-01 RX ADMIN — HEPARIN SODIUM 5000 UNITS: 1000 INJECTION, SOLUTION INTRAVENOUS; SUBCUTANEOUS at 10:10

## 2020-01-01 RX ADMIN — EPINEPHRINE 100 MCG: 1 INJECTION PARENTERAL at 11:42

## 2020-01-01 RX ADMIN — EPINEPHRINE 1000 MCG: 1 INJECTION PARENTERAL at 11:49

## 2020-01-01 RX ADMIN — EPINEPHRINE 1 MG: 0.1 INJECTION, SOLUTION ENDOTRACHEAL; INTRACARDIAC; INTRAVENOUS at 13:07

## 2020-01-01 RX ADMIN — EPINEPHRINE 0.5 MG: 1 INJECTION, SOLUTION, CONCENTRATE INTRAVENOUS at 10:25

## 2020-01-01 RX ADMIN — SODIUM CHLORIDE, SODIUM LACTATE, POTASSIUM CHLORIDE, AND CALCIUM CHLORIDE: 600; 310; 30; 20 INJECTION, SOLUTION INTRAVENOUS at 10:50

## 2020-01-01 RX ADMIN — EPHEDRINE SULFATE 25 MG: 50 INJECTION INTRAVENOUS at 11:43

## 2020-01-01 RX ADMIN — DEXAMETHASONE SODIUM PHOSPHATE 6 MG: 4 INJECTION, SOLUTION INTRAMUSCULAR; INTRAVENOUS at 09:09

## 2020-01-01 RX ADMIN — EPINEPHRINE 1 MG: 1 INJECTION, SOLUTION, CONCENTRATE INTRAVENOUS at 13:03

## 2020-01-01 RX ADMIN — ESMOLOL HYDROCHLORIDE 15 MG: 10 INJECTION, SOLUTION INTRAVENOUS at 10:28

## 2020-01-01 RX ADMIN — SODIUM BICARBONATE 50 MEQ: 84 INJECTION, SOLUTION INTRAVENOUS at 13:06

## 2020-01-01 RX ADMIN — SODIUM BICARBONATE 50 MEQ: 84 INJECTION, SOLUTION INTRAVENOUS at 11:52

## 2020-01-01 RX ADMIN — ESMOLOL HYDROCHLORIDE 10 MG: 10 INJECTION, SOLUTION INTRAVENOUS at 10:34

## 2020-01-01 RX ADMIN — ALTEPLASE 50 MG: KIT at 13:20

## 2020-01-01 RX ADMIN — CEFAZOLIN 1 G: 1 INJECTION, POWDER, FOR SOLUTION INTRAMUSCULAR; INTRAVENOUS at 09:33

## 2020-01-01 RX ADMIN — METOPROLOL TARTRATE 1 MG: 5 INJECTION, SOLUTION INTRAVENOUS at 09:13

## 2020-01-01 RX ADMIN — EPINEPHRINE 100 MCG: 1 INJECTION PARENTERAL at 11:44

## 2020-01-01 RX ADMIN — EPINEPHRINE 100 MCG: 1 INJECTION PARENTERAL at 12:24

## 2020-01-01 RX ADMIN — PHENYLEPHRINE HYDROCHLORIDE 160 MCG: 10 INJECTION, SOLUTION INTRAMUSCULAR; INTRAVENOUS; SUBCUTANEOUS at 10:40

## 2020-01-01 RX ADMIN — EPINEPHRINE 100 MCG: 1 INJECTION PARENTERAL at 11:40

## 2020-01-01 RX ADMIN — NOREPINEPHRINE BITARTRATE 4.7 MCG/MIN: 1 INJECTION, SOLUTION, CONCENTRATE INTRAVENOUS at 11:14

## 2020-01-01 RX ADMIN — EPINEPHRINE 100 MCG: 1 INJECTION PARENTERAL at 11:38

## 2020-01-01 RX ADMIN — PHENYLEPHRINE HYDROCHLORIDE 160 MCG: 10 INJECTION, SOLUTION INTRAMUSCULAR; INTRAVENOUS; SUBCUTANEOUS at 10:43

## 2020-01-01 RX ADMIN — EPHEDRINE SULFATE 5 MG: 50 INJECTION INTRAVENOUS at 09:43

## 2020-01-01 RX ADMIN — CALCIUM CHLORIDE 1 G: 100 INJECTION, SOLUTION INTRAVENOUS; INTRAVENTRICULAR at 13:17

## 2020-01-01 RX ADMIN — CALCIUM CHLORIDE 1 G: 100 INJECTION, SOLUTION INTRAVENOUS at 13:05

## 2020-01-01 RX ADMIN — EPINEPHRINE 0.5 MG: 1 INJECTION, SOLUTION, CONCENTRATE INTRAVENOUS at 10:26

## 2020-01-01 RX ADMIN — EPHEDRINE SULFATE 10 MG: 50 INJECTION INTRAVENOUS at 10:54

## 2020-01-01 RX ADMIN — CALCIUM CHLORIDE 0.5 G: 100 INJECTION, SOLUTION INTRAVENOUS at 10:26

## 2020-01-01 RX ADMIN — EPINEPHRINE 1 MG: 0.1 INJECTION, SOLUTION ENDOTRACHEAL; INTRACARDIAC; INTRAVENOUS at 13:20

## 2020-01-01 RX ADMIN — ALBUTEROL SULFATE 2.5 MG: 2.5 SOLUTION RESPIRATORY (INHALATION) at 11:10

## 2020-01-01 RX ADMIN — IOHEXOL 150 ML: 350 INJECTION, SOLUTION INTRAVENOUS at 06:56

## 2020-01-01 RX ADMIN — PHENYLEPHRINE HYDROCHLORIDE 160 MCG: 10 INJECTION, SOLUTION INTRAMUSCULAR; INTRAVENOUS; SUBCUTANEOUS at 10:42

## 2020-01-01 ASSESSMENT — PULMONARY FUNCTION TESTS
PIF_VALUE: 18
PIF_VALUE: 17
PIF_VALUE: 32
PIF_VALUE: 16
PIF_VALUE: 17
PIF_VALUE: 17
PIF_VALUE: 16
PIF_VALUE: 24
PIF_VALUE: 16
PIF_VALUE: 25
PIF_VALUE: 15
PIF_VALUE: 16
PIF_VALUE: 6
PIF_VALUE: 16
PIF_VALUE: 25
PIF_VALUE: 25
PIF_VALUE: 17
PIF_VALUE: 19
PIF_VALUE: 25
PIF_VALUE: 17
PIF_VALUE: 27
PIF_VALUE: 24
PIF_VALUE: 22
PIF_VALUE: 19
PIF_VALUE: 17
PIF_VALUE: 16
PIF_VALUE: 17
PIF_VALUE: 18
PIF_VALUE: 15
PIF_VALUE: 17
PIF_VALUE: 17
PIF_VALUE: 18
PIF_VALUE: 34
PIF_VALUE: 17
PIF_VALUE: 23
PIF_VALUE: 16
PIF_VALUE: 15
PIF_VALUE: 16
PIF_VALUE: 25
PIF_VALUE: 9
PIF_VALUE: 24
PIF_VALUE: 18
PIF_VALUE: 19
PIF_VALUE: 18
PIF_VALUE: 16
PIF_VALUE: 16
PIF_VALUE: 17
PIF_VALUE: 18
PIF_VALUE: 19
PIF_VALUE: 0
PIF_VALUE: 16
PIF_VALUE: 33
PIF_VALUE: 26
PIF_VALUE: 17
PIF_VALUE: 18
PIF_VALUE: 25
PIF_VALUE: 16
PIF_VALUE: 23
PIF_VALUE: 16
PIF_VALUE: 30
PIF_VALUE: 16
PIF_VALUE: 15
PIF_VALUE: 15
PIF_VALUE: 16
PIF_VALUE: 25
PIF_VALUE: 16
PIF_VALUE: 0
PIF_VALUE: 18
PIF_VALUE: 16
PIF_VALUE: 16
PIF_VALUE: 25
PIF_VALUE: 16
PIF_VALUE: 25
PIF_VALUE: 25
PIF_VALUE: 16
PIF_VALUE: 17
PIF_VALUE: 16
PIF_VALUE: 19
PIF_VALUE: 31
PIF_VALUE: 29
PIF_VALUE: 17
PIF_VALUE: 18
PIF_VALUE: 22
PIF_VALUE: 16
PIF_VALUE: 17
PIF_VALUE: 16
PIF_VALUE: 17
PIF_VALUE: 15
PIF_VALUE: 16
PIF_VALUE: 18
PIF_VALUE: 1
PIF_VALUE: 16
PIF_VALUE: 27
PIF_VALUE: 25
PIF_VALUE: 16
PIF_VALUE: 17
PIF_VALUE: 17
PIF_VALUE: 25
PIF_VALUE: 17
PIF_VALUE: 20
PIF_VALUE: 17
PIF_VALUE: 16
PIF_VALUE: 25
PIF_VALUE: 25
PIF_VALUE: 16
PIF_VALUE: 25
PIF_VALUE: 1
PIF_VALUE: 23
PIF_VALUE: 16
PIF_VALUE: 1
PIF_VALUE: 17
PIF_VALUE: 15
PIF_VALUE: 25
PIF_VALUE: 17
PIF_VALUE: 17
PIF_VALUE: 8
PIF_VALUE: 17
PIF_VALUE: 17
PIF_VALUE: 8
PIF_VALUE: 29
PIF_VALUE: 2
PIF_VALUE: 17
PIF_VALUE: 17
PIF_VALUE: 25
PIF_VALUE: 16
PIF_VALUE: 17
PIF_VALUE: 24
PIF_VALUE: 1
PIF_VALUE: 15
PIF_VALUE: 19
PIF_VALUE: 23
PIF_VALUE: 16
PIF_VALUE: 26
PIF_VALUE: 26
PIF_VALUE: 18
PIF_VALUE: 28
PIF_VALUE: 1
PIF_VALUE: 16
PIF_VALUE: 17
PIF_VALUE: 16
PIF_VALUE: 27
PIF_VALUE: 37
PIF_VALUE: 15
PIF_VALUE: 16
PIF_VALUE: 19
PIF_VALUE: 18
PIF_VALUE: 16
PIF_VALUE: 16
PIF_VALUE: 22
PIF_VALUE: 24
PIF_VALUE: 18
PIF_VALUE: 17
PIF_VALUE: 24
PIF_VALUE: 24
PIF_VALUE: 15
PIF_VALUE: 17
PIF_VALUE: 25
PIF_VALUE: 18
PIF_VALUE: 17
PIF_VALUE: 27
PIF_VALUE: 25
PIF_VALUE: 15
PIF_VALUE: 16
PIF_VALUE: 26
PIF_VALUE: 1
PIF_VALUE: 16
PIF_VALUE: 17
PIF_VALUE: 23
PIF_VALUE: 25
PIF_VALUE: 39
PIF_VALUE: 25
PIF_VALUE: 17
PIF_VALUE: 17
PIF_VALUE: 19
PIF_VALUE: 16
PIF_VALUE: 18
PIF_VALUE: 25
PIF_VALUE: 19
PIF_VALUE: 16
PIF_VALUE: 28
PIF_VALUE: 16
PIF_VALUE: 17
PIF_VALUE: 27
PIF_VALUE: 16
PIF_VALUE: 25
PIF_VALUE: 17
PIF_VALUE: 19
PIF_VALUE: 16
PIF_VALUE: 25
PIF_VALUE: 18
PIF_VALUE: 19
PIF_VALUE: 16
PIF_VALUE: 16
PIF_VALUE: 23
PIF_VALUE: 16
PIF_VALUE: 25
PIF_VALUE: 25
PIF_VALUE: 16
PIF_VALUE: 16
PIF_VALUE: 17
PIF_VALUE: 16
PIF_VALUE: 27
PIF_VALUE: 24
PIF_VALUE: 16
PIF_VALUE: 18
PIF_VALUE: 25
PIF_VALUE: 17
PIF_VALUE: 15
PIF_VALUE: 24
PIF_VALUE: 25
PIF_VALUE: 18
PIF_VALUE: 18
PIF_VALUE: 17
PIF_VALUE: 16
PIF_VALUE: 17
PIF_VALUE: 25
PIF_VALUE: 25
PIF_VALUE: 15
PIF_VALUE: 16
PIF_VALUE: 17
PIF_VALUE: 21
PIF_VALUE: 18
PIF_VALUE: 26
PIF_VALUE: 17
PIF_VALUE: 18
PIF_VALUE: 33

## 2020-01-01 ASSESSMENT — PAIN - FUNCTIONAL ASSESSMENT: PAIN_FUNCTIONAL_ASSESSMENT: 0-10

## 2020-01-01 ASSESSMENT — LIFESTYLE VARIABLES: SMOKING_STATUS: 1

## 2020-01-14 NOTE — PROGRESS NOTES
[facility] Pulmonary and Critical Care    Outpatient Follow Up Note    Subjective:   CHIEF COMPLAINT / HPI:     The patient is 80 y.o. female who is here for 6-month follow-up of COPD. She is taking Spiriva and Advair daily. She does not require much rescue albuterol use but is concerned about the cost of inhalers which is now running $87 per MDI. She denies fevers, chills, dyspnea on exertion, cough, wheezing, or chest tightness. COPD seems at baseline    Initial Visit 9/6/2018  Olivia Moran presents today for a new patient visit for Evaluation and management of COPD. Patient was referred by Dr. Lawyer Steinberg who sees her for chronic atrial fibrillation. She saw him last August 15 where she mentioned cough and shortness of breath. She previously had been on inhalers but had run out and was asking to have her Advair and Spiriva refilled. Olivia Moran has chart history of COPD although when she is asked about that she is unable to provide much history. Her son and daughter are there and provide significant portion of the history. Upon review of the chart she was admitted for pneumonia and acute encephalopathy in January. Her performance status was so poor that hospice was entertained. Her son states that over the last month Olivia Moran has really improved and he attributes it to having a nurse practitioner review her medications and reduce some of her pain medicine. He states that she is now much more active and gets out of her bedroom and participates more with the rest of the family. Her headaches are better and she is eating more. She occasionally mentions that she has dyspnea on exertion although her family states they never observed any breathing issues and they mention that their father had significant COPD and they are familiar with how patient's look with chronic lung disease. She does have a cough with morning phlegm that they attribute to a smoker's cough.   She is unable to describe it as she states mouth daily (with breakfast) 90 tablet 3    tiotropium (SPIRIVA) 18 MCG inhalation capsule Inhale 1 capsule into the lungs daily 90 capsule 3    albuterol sulfate HFA (PROAIR HFA) 108 (90 Base) MCG/ACT inhaler INHALE 2 PUFFS INTO THE LUNGS EVERY 4 HOURS AS NEEDED FOR WHEEZING OR SHORTNESS OF BREATH 1 Inhaler 11    KLOR-CON M10 10 MEQ extended release tablet TAKE 1 TABLET BY MOUTH 2 TIMES DAILY 180 tablet 1    mirtazapine (REMERON) 15 MG tablet Take 15 mg by mouth nightly      oxyCODONE (OXYCONTIN) 10 MG extended release tablet Take 10 mg by mouth every 12 hours. Consuelo Friend oxyCODONE-acetaminophen (PERCOCET) 7.5-325 MG per tablet Take 1 tablet by mouth every 6 hours as needed for Pain. Consuelo Friend DULoxetine (CYMBALTA) 30 MG extended release capsule Take 1 capsule by mouth daily 30 capsule 0    Naloxone HCl (EVZIO) 2 MG/0.4ML SOAJ Use as directed 1 Package 0    lactobacillus (CULTURELLE) CAPS capsule Take 1 capsule by mouth daily 30 capsule 0    QUEtiapine (SEROQUEL) 25 MG tablet Take 50 mg by mouth nightly as needed (sleep)       omeprazole (PRILOSEC) 40 MG delayed release capsule TAKE 1 CAPSULE BY MOUTH DAILY 180 capsule 1    predniSONE (DELTASONE) 20 MG tablet Take 2 tablets by mouth daily (Patient not taking: Reported on 1/14/2020) 14 tablet 0    gabapentin (NEURONTIN) 300 MG capsule Take 1 capsule by mouth 2 times daily for 30 days. . 60 capsule 0     No current facility-administered medications on file prior to visit.         REVIEW OF SYSTEMS:    CONSTITUTIONAL: Negative for fevers and chills  HEENT: Negative for oropharyngeal exudate, post nasal drip, sinus pain / pressure, nasal congestion, ear pain  RESPIRATORY:  See HPI  CARDIOVASCULAR: Negative for chest pain, palpitations, edema  GASTROINTESTINAL: Negative for nausea, vomiting, diarrhea, constipation and abdominal pain  GENITOURINARY: Negative for dysuria, urinary frequency, urinary hesitancy  HEMATOLOGICAL: Negative for adenopathy  SKIN: Negative for arch.           Impression   1.  No evidence of acute cardiopulmonary disease. Last PFTs: September 2011  Flow studies without bronchodilators, normal FEV1/FVC ratio, with normal FVC  and FEV1. Flow volume loop somewhat erratic, but appears normal.      Lung volumes were not completely done due to the patients difficulty with the  testing procedure, but the vital capacity is noted to be normal.      Arterial blood gases done on room air at rest with a pH of 7.45, pCO2 of 34  and a PaO2 of 94 are normal findings. IMPRESSION:    1. No evidence of obstruction to air flow. 2.  Limited volume studies, but no significant evidence of restrictive  disease. 3.  Resting arterial blood gases on room air are normal.     Assessment:      Diagnosis Orders   1. COPD, severity to be determined ([facility]     2. History of tobacco use         Plan:     Clinically patient's history is consistent with COPD, which is well controlled at present. Cont Advair, Spiriva and prn albuterol (son to look into insurance coverage of albuterol and also if she finds that a nebulizer would be helpful -he will let us know)  Patient is not smoking. She is not a candidate for lung cancer screening.   She is up-to-date with Pneumovax and Prevnar 13    Return to clinic in 6 months or sooner if needed

## 2020-02-24 NOTE — PROGRESS NOTES
CC/HPI:  80 y.o. patient of Dr. Kavin Carney with AAA, paroxysmal A fib, HTN, dCHF, and  COPD who is here for 3 month follow up. She has chronic, unchanged SOB with exertion. No chest pain, orthopnea, LH/dizziness, palpitations or syncope. No LE edema, n/v/d or GI/ bleeding. No fever or cough or weight gain. She has BLE neuropathy and her feet are numb all the time. Past Medical History:   Diagnosis Date    AAA (abdominal aortic aneurysm) (HCC)     Arthritis     Atrial fibrillation (HCC)     Paroxysmal artial fib during hospitalization for sepsis; seen by Dr. Kavin Carney, later had stress test; anticoagulated with Plavix    Atypical chest pain 7/30/2013    Carpal tunnel syndrome of left wrist     Chronic pain syndrome     COPD (chronic obstructive pulmonary disease) ([address]     Duodenal ulcer 5/10/2012    History of atrial fibrillation 11/12/2015    Hypertension     Insomnia     Lumbar spondylosis     Lumbar spondylosis     Mucopurulent chronic bronchitis ([address]     Peripheral arterial occlusive disease ([address]     Post-laminectomy syndrome     Pre-syncope 1/10/2013    1/13 Admitted to [facility]. May well have been a seizure. Family observed 3-5 min of shaking of extremities.  Renal artery stenosis ([address] 10/09    [facility] records    Renal artery stenosis ([address]     SBO (small bowel obstruction) ([address]     Seizure ([address] 1/9/2013    Spinal stenosis      Past Surgical History:   Procedure Laterality Date    ABDOMEN SURGERY N/A 03/17/2016    LAPAROSCOPIC CHOLECYSTECTOMY WITH INTRAOPERATIVE    BACK SURGERY      COLONOSCOPY  1/10    Dr. Mary Grace Carney; 2 small polyps, adenomas    HYSTERECTOMY      OTHER SURGICAL HISTORY      excision of tender calcified mass    OTHER SURGICAL HISTORY      blood clots     OTHER SURGICAL HISTORY  02/14/11    right femoral popliteal bypass     No family history on file.   Social History   Substance Use Topics    Smoking status: Former Smoker     Packs/day: 1.00     Years: How Severe Is It?: moderate Is This A New Presentation, Or A Follow-Up?: Rash Additional History: Patient states that he has not used anything to treat this rash on his hands. Patient states that he would like to have it evaluated today.

## 2020-05-07 NOTE — TELEPHONE ENCOUNTER
LM on  for Veronica Guerra to call the office to schedule an in person visit for pt for next week to discuss test results

## 2020-05-14 PROBLEM — T82.858A BYPASS GRAFT STENOSIS (HCC): Status: ACTIVE | Noted: 2020-01-01

## 2020-05-14 NOTE — LETTER
Surgery Schedule Request Form  [facility]  [address] [place] [address]. Britney Trujillo [facility]  DATE OF SURGERY: 6/2/2020      TIME OF SURGERY: 0900a     Confirmation#:__________________      Surgeon Name: Jessica Vasquez MD   Phone: [phone]     Fax: [phone]    Procedure Name: Endovascular aortic aneurysm repair (EVAR); possible renal artery stent  CPT CODES (required for scheduling):   DIAGNOSIS: AAA    LENGTH OF PROCEDURE: 2 hours  Patient Status: Admit    Labs Needed:   CBC, PT/INR, BMP, 12 LEAD EKG            PATIENT NAME: Carloa Landry            YOB: 1934  SEX: female   SS #:   PHONE: [phone] (home)     Pre-Op to be done by: PACU DEYVI  Cardiac Clearance Done by: Dr. Matt Fam  Pt Position:  supine  Fluoroscopy: yes  [facility] OR  Patient to meet with Anesthesiology prior to surgery: yes  Medications to be stopped 2 DAYS prior to surgery:  XARELTO  Continue Aspirin? YES  Preop IV antibiotics to be given 30 minutes prior to incision: ancef 1 g IV    INSURANCE:                                               SUBSCRIBER NAME:   MEMBER ID:                                                AUTHORIZATION #:     PCP: Referring Not In System (Inactive)                                        ANESTHESIA:  General    Jessica Vasquez MD  5/22/2020 12:55 PM                              FAX TO: [phone]   QUESTIONS?  CALL: [phone]

## 2020-05-20 NOTE — OP NOTE
Operative Note      Patient: Matty Sears  YOB: 1934  MRN: [id number]  Date of Operation: 05/20/20    Pre-operative Diagnosis:   1. Right lower extremity bypass stenosis with impending graft failure    Post-operative Diagnosis:   1. Right lower extremity bypass stenosis with impending graft failure    Procedures Performed:  1. Right lower extremity arteriogram by left femoral and left brachial approach  2. Third order selective catheterization of right lower extremity arterial system. Surgeon:  Walker Hastings MD.    Anesthesia:  Monitored IV sedation  Under my supervision, Versed and fentanyl administered intravenously for moderate sedation. Pulse oximetry, heart rate, and BP were continuously monitored by an independent trained observer present. I spent 111 minutes of face to face sedation time with the patient. Total medications received:  Versed 4.0 mg IV and Fentanyl 200 mcg IV  Total time of monitored sedation:  111 minutes     Estimated Blood Loss:  10 mL. Indications for Procedure:  Matty Sears is a 80 y.o. female who was evaluated as an outpatient for upcoming endovascular treatment of AAA, but she also had a history of right leg bypass. Duplex imaging of the right lower extremity indicated that the bypass fauces were in the range of impending graft failure and I recommended to treat this prior to treatment of the AAA. There was concern that treatment of the AAA would cause failure of the bypass graft. Informed consent was obtained after discussion of potential benefits, alternatives and risks of the procedure, including but not limited to bleeding, infection, worsening of arterial insufficiency, and failure of the procedure. All questions were answered and she wished to proceed. Details of Procedure: The patient was taken to the [facility] OR and placed in supine position. IV sedation was administered and personally supervised by myself.    The operative area was There is extensive tibioperoneal disease with two-vessel runoff to the foot by the anterior tibial (dominant vessel) and peroneal.    Therapeutic Intervention:  Despite multiple attempts to cross the right popliteal artery stenoses, these were unsuccessful. Multiple systems including 0.035, 0.018, and 0.014 wires and Crossing catheters were utilized, with no success. The left brachial artery sheath was removed and compression was held for hemostasis. The patient tolerated the procedure well and was taken to the post-operative care unit in stable condition. She will return next week for repeat intervention by a pedal artery approach.     Margi Yun MD      Electronically signed by Margi Yun MD on 5/20/2020 at 9:15 AM

## 2020-05-20 NOTE — PRE SEDATION
Sedation Pre-Procedure Note    Patient Name: Elizabeth Rachel   YOB: 1934  Room/Bed: Cath Pool/NONE  Medical Record Number: [id number]  Date: 5/20/2020   Time: 9:12 AM       Indication:  Impending failure of right leg bypass    Consent: I have discussed with the patient and/or the patient representative the indication, alternatives, and the possible risks and/or complications of the planned procedure and the anesthesia methods. The patient and/or patient representative appear to understand and agree to proceed. Vital Signs:   Vitals:    05/20/20 0815   Temp: 97.8 °F (36.6 °C)       Past Medical History:   has a past medical history of AAA (abdominal aortic aneurysm) ([address] Arthritis, Atrial fibrillation ([address] Atypical chest pain, Carpal tunnel syndrome of left wrist, Chronic pain syndrome, COPD (chronic obstructive pulmonary disease) ([address] Duodenal ulcer, History of atrial fibrillation, Hypertension, Insomnia, Lumbar spondylosis, Lumbar spondylosis, Mucopurulent chronic bronchitis (HCC), Peripheral arterial occlusive disease ([address] Post-laminectomy syndrome, Pre-syncope, Renal artery stenosis ([address] Renal artery stenosis ([address] SBO (small bowel obstruction) ([address] Seizure ([address] and Spinal stenosis. Past Surgical History:   has a past surgical history that includes Colonoscopy (1/10); other surgical history; other surgical history; Hysterectomy; back surgery; other surgical history (02/14/11); and Abdomen surgery (N/A, 03/17/2016). Medications:   Scheduled Meds:   Continuous Infusions:   PRN Meds:   Home Meds:   Prior to Admission medications    Medication Sig Start Date End Date Taking?  Authorizing Provider   KLOR-CON M10 10 MEQ extended release tablet TAKE 1 TABLET BY MOUTH TWICE A DAY 1/20/20  Yes Villarreal Primmer, APRN - CNP   fluticasone-salmeterol (ADVAIR DISKUS) 500-50 MCG/DOSE diskus inhaler Inhale 1 puff into the lungs every 12 hours 1/14/20 1/13/21 Yes Selwyn Roca MD atorvastatin (LIPITOR) 20 MG tablet Take 1 tablet by mouth daily 10/25/19  Yes Nick Romero MD   lisinopril (PRINIVIL;ZESTRIL) 5 MG tablet Take 1 tablet by mouth daily 10/25/19  Yes Nick Romero MD   tiotropium [facility]) 18 MCG inhalation capsule Inhale 1 capsule into the lungs daily 3/25/19  Yes Kurtis Hull MD   gabapentin (NEURONTIN) 300 MG capsule Take 1 capsule by mouth 2 times daily for 30 days. . 9/11/18 5/14/23 Yes Lauren Parker MD   oxyCODONE-acetaminophen (PERCOCET) 7.5-325 MG per tablet Take 1 tablet by mouth every 6 hours as needed for Pain. .   Yes Historical Provider, MD   DULoxetine (CYMBALTA) 30 MG extended release capsule Take 1 capsule by mouth daily 6/18/18  Yes Lauren Parker MD   QUEtiapine (SEROQUEL) 25 MG tablet Take 50 mg by mouth nightly as needed (sleep)    Yes Historical Provider, MD   omeprazole (PRILOSEC) 40 MG delayed release capsule TAKE 1 CAPSULE BY MOUTH DAILY 7/3/17  Yes KARMA Esparza CNP   XARELTO 20 MG TABS tablet TAKE 1 TABLET BY MOUTH EVERY DAY WITH BREAKFAST 4/22/20   KARMA Mccoy CNP   albuterol sulfate HFA (PROAIR HFA) 108 (90 Base) MCG/ACT inhaler INHALE 2 PUFFS BY MOUTH EVERY 4 HOURS AS NEEDED FOR WHEEZE OR FOR SHORTNESS OF BREATH 11/21/19   Kurtis Hull MD   predniSONE (DELTASONE) 20 MG tablet Take 2 tablets by mouth daily 3/25/19   Kurtis Hull MD   mirtazapine (REMERON) 15 MG tablet Take 15 mg by mouth nightly    Historical Provider, MD   oxyCODONE (OXYCONTIN) 10 MG extended release tablet Take 10 mg by mouth every 12 hours. .    Historical Provider, MD   Naloxone HCl (EVZIO) 2 MG/0.4ML SOAJ Use as directed 6/18/18   Lauren Parker MD   lactobacillus (CULTURELLE) CAPS capsule Take 1 capsule by mouth daily 6/12/18   Jennifer Bernstein MD     Coumadin Use Last 7 Days:  no  Antiplatelet drug therapy use last 7 days: yes - ASA  Other anticoagulant use last 7 days: yes - Xarelto, held since Sunday  Additional Medication Information:

## 2020-05-22 NOTE — PROGRESS NOTES
[facility] / [facility] ([facility]) [address] [facility], [address]    Acknowledgment of Informed Consent for Surgical or Medical Procedure and Sedation  I agree to allow doctor(s) NICOLE VARGAS and his/her associates or assistants, including residents and/or other qualified medical practitioner to perform the following medical treatment or procedure and to administer or direct the administration of sedation as necessary:  Procedure(s): ENDOVASCULAR ABDOMINAL AORTIC ANEURYSM REPAIR (EVAR); POSSIBLE RENAL ARTERY STENT   My doctor has explained the following regarding the proposed procedure:   the explanation of the procedure   the benefits of the procedure   the potential problems that might occur during recuperation   the risks and side effects of the procedure which could include but are not limited to severe blood loss, infection, stroke or death   the benefits, risks and side effect of alternative procedures including the consequences of declining this procedure or any alternative procedures   the likelihood of achieving satisfactory results. I acknowledge no guarantee or assurance has been made to me regarding the results. I understand that during the course of this treatment/procedure, unforeseen conditions can occur which require an additional or different procedure. I agree to allow my physician or assistants to perform such extension of the original procedure as they may find necessary. I understand that sedation will often result in temporary impairment of memory and fine motor skills and that sedation can occasionally progress to a state of deep sedation or general anesthesia. I understand the risks of anesthesia for surgery include, but are not limited to, sore throat, hoarseness, injury to face, mouth, or teeth; nausea; headache; injury to blood vessels or nerves; death, brain damage, or paralysis.     I understand that if I have a Limitation of Treatment order in effect during my hospitalization, the order may or may not be in effect during this procedure. I give my doctor permission to give me blood or blood products. I understand that there are risks with receiving blood such as hepatitis, AIDS, fever, or allergic reaction. I acknowledge that the risks, benefits, and alternatives of this treatment have been explained to me and that no express or implied warranty has been given by the hospital, any blood bank, or any person or entity as to the blood or blood components transfused. At the discretion of my doctor, I agree to allow observers, equipment/product representatives and allow photographing, and/or televising of the procedure, provided my name or identity is maintained confidentially. I agree the hospital may dispose of or use for scientific or educational purposes any tissue, fluid, or body parts which may be removed.     ________________________________Date________Time______ am/pm  ([facility] [facility])  Patient or Signature of Closest Relative or Legal Guardian    ________________________________Date________Time______am/pm      Page 1 of  1  Witness

## 2020-05-26 NOTE — PROGRESS NOTES
Spoke with pt's son. He states pt cancelled her apt with cardiologist because she has a procedure tomorrow. Pt's son stated coming in for PAT would be to much for pt at this point. He declined nurse interview as so many people were calling. Instructions for DOS given. Spoke with Guy Hebert at Dr. Yamileth Menon, informed her of above. She will call pt as she does need cardiac clearance.    Per Guy Hebert, PAT may be done DOS, labs need to be repeated    Spoke with Guy Hebert, she is still working on the clearance

## 2020-05-28 NOTE — TELEPHONE ENCOUNTER
Per  patient needs a [employer] before he will clear her. I called central scheduling and per pt insurance they wont cover it if done before June 3rd. I spoke with Ruth Hutchinson in [place] and updated her at where we are at.      I also spoke with Elijah Venegas and will keep him posted on where to take her for a junior    Charlene  looking into quick prior auth

## 2020-06-02 NOTE — CONSULTS
Reason for consult: Cardiac arrest  80 y.o. woman, known to me for her a fib, came to hospital for elective AAA repair. Has PAD and a recent neg stress nuclear exam.  Apparently, during her procedure, just after vascular access, she had a PEA arrest. At one point, there were ST elevations on the monitor and she underwent a cardiac cath. Cath reviewed and while there was significant CAD, there was NABIL 3 flow in all vessels. The RCA had 1 stent placed. An IABP was placed. She was treated with acls several times in the OR and placed on epi and levophed drips. A stat echo was done at the bedside that appeared to show a fairly normal sized RV and no sig pericardial effusion. She had amio started after she was found to be severely tachycardic (has h/o a fib rvr as well). She was transferred to the ICU where she arrested (PEA) again at around 1:03. Several rounds of cpr and acls drug administration has occurred. IABP on standby during this. tPA given as well in the event a PE was the culprit (given during cpr). I assisted in the code and decision-making with Dr. Justa Champion. Past Medical History:   Diagnosis Date    AAA (abdominal aortic aneurysm) (HCC)     Arthritis     Atrial fibrillation (HCC)     Paroxysmal artial fib during hospitalization for sepsis; seen by Dr. Javier Melton, later had stress test; anticoagulated with Plavix    Atypical chest pain 7/30/2013    Carpal tunnel syndrome of left wrist     Chronic pain syndrome     COPD (chronic obstructive pulmonary disease) ([address]     Duodenal ulcer 5/10/2012    Hypertension     Insomnia     Lumbar spondylosis     Mucopurulent chronic bronchitis ([address]     Peripheral arterial occlusive disease ([address]     Post-laminectomy syndrome     Pre-syncope 1/10/2013    1/13 Admitted to [facility] May well have been a seizure. Family observed 3-5 min of shaking of extremities.      Renal artery stenosis ([address] 10/09    [facility] records    SBO (small bowel

## 2020-06-02 NOTE — ANESTHESIA POSTPROCEDURE EVALUATION
Department of Anesthesiology  Postprocedure Note    Patient: Sal Alvraes  MRN: [id number]  YOB: 1934  Date of evaluation: 6/2/2020  Time:  1:50 PM     Procedure Summary     Date:  06/02/20 Room / Location:  [address]    Anesthesia Start:  7234 Anesthesia Stop:  1555    Procedure:  ABORTED ENDOVASCULAR ABDOMINAL AORTIC ANEURYSM REPAIR; PERCUTANEOUS BALLOON PUMP INSERTION; PERCUTANEOUS CORONARY STENT (N/A ) Diagnosis:       Abdominal aortic aneurysm (AAA) without rupture (HCC)      (Abdominal aortic aneurysm (AAA) ([facility] [I71.4])    Surgeon:  Donna Adams MD Responsible Provider:  Oz Noel MD    Anesthesia Type:  general ASA Status:  4          Anesthesia Type: general    Ibeth Phase I: Ibeth Score: 10    Ibeth Phase II:      Last vitals: Reviewed and per EMR flowsheets. Anesthesia Post Evaluation    Patient location during evaluation: ICU  Comments: IABP placed intraOp, Echo done intraop,  Pt moved to bed and transported to ICU uneventful. Upon arrival to ICU, pt lost pulses and no discernible BP. Epi 1 mg given IV and Code called, cpr commenced. Pt had multiple rounds of CPR and was never able to restore rhythm.    TOD called at 1327

## 2020-06-02 NOTE — SIGNIFICANT EVENT
Vascular Surgery   Significant Event     Patient brought to the ICU from from OR around 1 pm. Upon entering the ICU the patient was noted to be profoundly hypotensive. Upon entering into the room she was noted to be in PEA arrest.  Therefore a Code Blue was called and the ACLS was initiated. CPR was stated at 13:03. The patient continued to receive ACLS without return of ROSC. The patient was flipping between PEA and ventricular fibrillation. She received five shocks applied with the defibrillator without success of ROSC. The son was at that time in the facility and options were discussed and he elected to stop the code at that time. Time of death was called at 13:27. Dr. Ardella Lombard. Dr. Dorothy Velazquez, and Dr. Natalie Miranda were also present for the code.      Curt Toledo MD  6/2/2020  5:03 PM  [phone]

## 2020-06-02 NOTE — PROGRESS NOTES
Patient manually bagged for 24 minutes per ACLS guidelines on a 100% FIO2 during chest compressions. Capnography readings ranged from 8 mmHg to 11 mmHg during manual bagging and chest compressions. The code started at [phone] [id number] and ended at 36. B.S. equal bilat.

## 2020-06-02 NOTE — PROGRESS NOTES
Vascular Surgery Progress Note      Patient went into cardiac arrest shortly after arriving in ICU. CPR initiated per ACLS protocol--please see separate flowsheet. ACLS continued for 24 minutes without ROSC. Code called and pt pronounced dead at [phone]. I discussed with son and all questions answered. I recommended post-mortem exam but family has declined.

## 2020-06-02 NOTE — PROGRESS NOTES
FOLLOWING END OF PROCEDURE:    CENTRAL LINE INSERTED BY DR. Shaun Guadarrama  12 LEAD EKG AND TRANSTHORACIC ECHOCARDIOGRAM DONE PER DR. BRADSHAW

## 2020-06-02 NOTE — BRIEF OP NOTE
Brief Postoperative Note      Patient: Yan Blankenship  YOB: 1934  MRN: [id number]    Date of Procedure: 6/2/2020    Pre-Op Diagnosis: Abdominal aortic aneurysm (AAA) ([address] [I71.4]    Post-Op Diagnosis: Same       Procedure(s):  ABORTED ENDOVASCULAR ABDOMINAL AORTIC ANEURYSM REPAIR--procedure terminated; PERCUTANEOUS BALLOON PUMP INSERTION; PERCUTANEOUS CORONARY STENT    Surgeon(s):  MD Dr. Justyna Louis    Assistant:  Surgical Assistant: Vonda Navarrete  Resident: Asad Sow DO; Chris Montemayor MD    Anesthesia: General    Estimated Blood Loss (mL): less than 50     Complications: Cardiac Arrest      Drains:   Urethral Catheter Latex 16 fr (Active)       [REMOVED] Urethral Catheter Non-latex (Removed)       [REMOVED] External Urinary Catheter (Removed)       Findings: see op note.     Electronically signed by Rosemary Kennedy MD on 6/2/2020 at 1:47 PM

## 2020-06-02 NOTE — ANESTHESIA PRE PROCEDURE
DULoxetine (CYMBALTA) 30 MG extended release capsule Take 1 capsule by mouth daily 6/18/18   Rachell Mercer MD   Naloxone HCl (EVZIO) 2 MG/0.4ML SOAJ Use as directed 6/18/18   Rachell Mercer MD   lactobacillus (CULTURELLE) CAPS capsule Take 1 capsule by mouth daily 6/12/18   Rosendo Doshi MD   QUEtiapine (SEROQUEL) 25 MG tablet Take 50 mg by mouth nightly as needed (sleep)     Historical Provider, MD   omeprazole (PRILOSEC) 40 MG delayed release capsule TAKE 1 CAPSULE BY MOUTH DAILY 7/3/17   Sallie Zamudio, APRN - CNP       Current medications:    No current facility-administered medications for this encounter. Allergies: Allergies   Allergen Reactions    Morphine And Related        Problem List:    Patient Active Problem List   Diagnosis Code    HTN (hypertension) I10    COPD (chronic obstructive pulmonary disease) J44.9    Peripheral arterial occlusive disease (HCC) I77.9    AAA (abdominal aortic aneurysm) (HCC) I71.4    Renal artery stenosis (HCC) I70.1    Chronic pain syndrome G89.4    Post-laminectomy syndrome M96.1    Spinal stenosis M48.00    Primary insomnia F51.01    Lumbar spondylosis M47.816    Carpal tunnel syndrome on left G56.02    Anemia D64.9    Carotid stenosis I65.29    Mood disorder of depressed type F32.9    Constipation K59.00    Colitis K52.9    Mucopurulent chronic bronchitis (HCC) J41.1    Persistent atrial fibrillation I48.19    Nonrheumatic mitral valve regurgitation I34.0    History of atrial fibrillation Z86.79    Elbow pain, chronic M25.529, G89.29    Tremors of nervous system R25.1    Dizziness R42    Neck mass R22.1    Centrilobular emphysema (HCC) J43.2    Sprain of cervical neck S13. 9XXA    Sprain of ligaments of cervical spine S13. 4XXA    Pneumonia due to organism J18.9    Non-rheumatic tricuspid valve insufficiency I36.1    DDD (degenerative disc disease), cervical M50.30    Spinal stenosis of cervical region M48.02    Cervical radiculitis M54.12    Chronic atrial fibrillation I48.20    Congestive heart failure (HCC) I50.9    Acute diastolic CHF (congestive heart failure) ([facility]) I50.31    CAP (community acquired pneumonia) due to Chlamydia species J16.0    Neck pain M54.2    Encounter for hospice care discussion Z71.89    Encounter for palliative care Z51.5    Appetite loss R63.0    DNR (do not resuscitate) Z66    Chronic fatigue R53.82    Atrial fibrillation, rapid (HCC) I48.91    Bypass graft stenosis ([facility]) R38.671T       Past Medical History:        Diagnosis Date    AAA (abdominal aortic aneurysm) ([facility])     Arthritis     Atrial fibrillation (HCC)     Paroxysmal artial fib during hospitalization for sepsis; seen by Dr. Shanta Yanez, later had stress test; anticoagulated with Plavix    Atypical chest pain 7/30/2013    Carpal tunnel syndrome of left wrist     Chronic pain syndrome     COPD (chronic obstructive pulmonary disease) ([address]     Duodenal ulcer 5/10/2012    Hypertension     Insomnia     Lumbar spondylosis     Mucopurulent chronic bronchitis ([address]     Peripheral arterial occlusive disease ([address]     Post-laminectomy syndrome     Pre-syncope 1/10/2013    1/13 Admitted to [facility] May well have been a seizure. Family observed 3-5 min of shaking of extremities.      Renal artery stenosis ([address] 10/09    [facility] records    SBO (small bowel obstruction) ([address]     Seizure ([address] 1/9/2013    Spinal stenosis        Past Surgical History:        Procedure Laterality Date    ABDOMEN SURGERY N/A 03/17/2016    LAPAROSCOPIC CHOLECYSTECTOMY WITH INTRAOPERATIVE    BACK SURGERY      COLONOSCOPY  1/10    Dr. Derek Portillo; 2 small polyps, adenomas    HYSTERECTOMY      OTHER SURGICAL HISTORY      excision of tender calcified mass    OTHER SURGICAL HISTORY      blood clots     OTHER SURGICAL HISTORY  02/14/11    right femoral popliteal bypass       Social History:    Social History     Tobacco Use    Smoking status: Former Smoker Packs/day: 1.00     Years: 35.00     Pack years: 35.00     Types: Cigarettes     Last attempt to quit: 2012     Years since quittin.3    Smokeless tobacco: Never Used    Tobacco comment: using e cigarette   Substance Use Topics    Alcohol use: Yes     Alcohol/week: 1.0 standard drinks     Types: 1 Shots of liquor per week     Comment: 2-3 times a year                                Counseling given: Not Answered  Comment: using e cigarette      Vital Signs (Current):   Vitals:    20 0731   BP: 133/70   Pulse: 121   Resp: 18   Temp: 97.7 °F (36.5 °C)   TempSrc: Oral   SpO2: 91%   Weight: 127 lb (57.6 kg)   Height: 5' 3\" (1.6 m)                                              BP Readings from Last 3 Encounters:   20 133/70   20 (!) 140/69   20 118/64       NPO Status:                                                                                 BMI:   Wt Readings from Last 3 Encounters:   20 127 lb (57.6 kg)   20 127 lb (57.6 kg)   20 126 lb (57.2 kg)     Body mass index is 22.5 kg/m². CBC:   Lab Results   Component Value Date    WBC 4.9 2020    RBC 3.24 2020    HGB 10.3 2020    HCT 31.6 2020    MCV 97.4 2020    RDW 14.6 2020     2020       CMP:   Lab Results   Component Value Date     2020    K 4.2 2020    K 4.6 2018     2020    CO2 22 2020    BUN 16 2020    CREATININE 1.3 2020    GFRAA 47 2020    GFRAA >60 04/15/2013    AGRATIO 0.9 2018    LABGLOM 39 2020    GLUCOSE 98 2020    PROT 6.1 2018    PROT 5.9 2013    CALCIUM 8.6 2020    BILITOT 0.3 2018    ALKPHOS 100 2018    AST 13 2018    ALT 6 2018       POC Tests: No results for input(s): POCGLU, POCNA, POCK, POCCL, POCBUN, POCHEMO, POCHCT in the last 72 hours.     Coags:   Lab Results   Component Value Date    PROTIME 12.3 2020    INR 1.06 05/27/2020    APTT 68.8 01/01/2018       HCG (If Applicable): No results found for: PREGTESTUR, PREGSERUM, HCG, HCGQUANT     ABGs:   Lab Results   Component Value Date    PHART 7.31 01/31/2013    PO2ART 57 01/31/2013    LKJ7TEB 34 01/31/2013    ACZ8IYQ 17 01/31/2013    BEART -8.3 01/31/2013        Type & Screen (If Applicable):  Lab Results   Component Value Date    LABABO O 01/06/2012    LABRH Positive 01/06/2012       Drug/Infectious Status (If Applicable):  No results found for: HIV, HEPCAB    COVID-19 Screening (If Applicable):   Lab Results   Component Value Date    COVID19 Not Detected 05/29/2020         Anesthesia Evaluation  Patient summary reviewed  Airway: Mallampati: II  TM distance: >3 FB   Neck ROM: full  Mouth opening: > = 3 FB Dental:    (+) upper dentures and edentulous      Pulmonary: breath sounds clear to auscultation  (+) COPD:  current smoker (quit but smokes one \" here and there\")                           Cardiovascular:  Exercise tolerance: good (>4 METS),   (+) hypertension:, dysrhythmias: atrial fibrillation, CHF:, hyperlipidemia    (-) past MI and CAD      Rhythm: irregular  Rate: abnormal                    Neuro/Psych:   (+) neuromuscular disease (cervical radiculitis):, psychiatric history (mood disorder):depression/anxiety    (-) seizures            ROS comment: DDD GI/Hepatic/Renal:   (+) GERD:,      (-) hepatitis, liver disease and no morbid obesity       Endo/Other:    (+) : arthritis:., no malignancy/cancer. (-) diabetes mellitus, hypothyroidism, hyperthyroidism, no malignancy/cancer               Abdominal:           Vascular:   + PVD, aortic or cerebral, .  - DVT and PE.       ROS comment: PAD  AAA  Carotid Artery disease. Anesthesia Plan      general     ASA 4     (Central line vs large bore IV x 2)  Induction: intravenous.   arterial line and central line  MIPS: Postoperative opioids intended and Prophylactic antiemetics

## 2020-06-02 NOTE — OP NOTE
Operative Note      Patient: Elizabteh Rachel  YOB: 1934  MRN: [id number]    Date of Procedure: 6/2/2020    Pre-Op Diagnosis: Abdominal aortic aneurysm (AAA) ([facility] [I71.4]    Post-Op Diagnosis: Same       Procedure(s):  ABORTED ENDOVASCULAR ABDOMINAL AORTIC ANEURYSM REPAIR--procedure terminated PERCUTANEOUS BALLOON PUMP INSERTION; PERCUTANEOUS CORONARY STENT    Surgeon(s):  Imani Vasquez MD    Assistant:   Surgical Assistant: Leonieds Sahu  Resident: Crystal Astudillo DO; Stefany Dean MD    Anesthesia: General    Estimated Blood Loss (mL): less than 50     Complications: Cardiac Arrest    Specimens:   * No specimens in log *    Implants:  * No implants in log *      Drains:   Urethral Catheter Latex 16 fr (Active)       [REMOVED] Urethral Catheter Non-latex (Removed)       [REMOVED] External Urinary Catheter (Removed)     Indication for Procedure:  Ms. Judith Gandara is an 81 yo female who was being followed for AAA. Her most recent CTA A/P in 12/2019 was significant for AAA measuring 5.3cm, which was increased in comparison to last imaging study in 2016, at which time it measured 3.8. She was concerned about rupture and after reviewing risks and benefits, including but not limited to risk of bleeding, aneurysm rupture, arterial injury, cardiopulmonary event, need for transfusion or ventilation, and risk of death, she wanted to proceed. She was evaluated pre-operatively with a cardiac nuclear medicine scan which was normal.      Detailed Description of Procedure:   Patient was taken to the hybrid room and general anesthesia was induced. IV antibiotics were given and patient was prepped and draped in the usual sterile fashion.   Based upon pre-operative imaging, the patient was a candidate for endovascular aortic aneurysm repair, and a [place] C3 device was chosen with the following components:  Main body through left femoral 16 F sheath 26mm x 12mm x 18cm with possible extension and right contralateral

## 2020-06-02 NOTE — PROCEDURES
CARDIOLOGY CATH LAB NOTE  [facility]       Left heart catheterization with coronary angiography    Helio Arceo  80 y.o.  [id number]  6/2/2020, 1:18 PM  Referring MD : Referring Not In System (Inactive)MD  Procedure Coronary Angiogram   Left heart cath  PTCA with stent to the right coronary artery  Intra-aortic balloon pump  Selective coronary angiogram  Left ventriculogram  Right ileofemoral angiogram  Closure device (Angioseal)    This patient is seen  emergently at the request of Dr. Dejah Taylor who is in the hybrid interventional room performing procedure for AAA aneurysm. The patient is identified by Dr. Dejah Taylor is having severe calcified peripheral vessels. She has already accessed the left femoral artery. She did have surgery in the right femoral artery a week or so ago. In the course of the procedure the patient developed what looked like of an acute   inferior wall ST segment elevation infarct. The ventilation being managed by anesthesia and she is sedated heavily. The EKG rhythm strip did show significant ST segment elevation in what appeared to be the inferior leads. Dr. Dejah Taylor stood side from her procedure to allow emergent angiography. We were able to utilize the left femoral artery access that Dr. Dejah Taylor had already achieved. We were then able to place a long 8 [place] sheath into the femoral artery and ileac . Following this we used a longer guidewire was able to place a JL4 diagnostic and inject the left coronary artery in various  degrees of obliquity. We then used the JR4 diagnostic to access the right coronary artery and injected that in various degrees of obliquity. The patient was found to have significant dense calcification of the entirety of the right coronary artery. It was somewhat curvy vessel.   In the midportion there is atherosclerotic   plaque and atheroma and just proximal to that there is what appeared to be significant narrowing and may be some artery. Indication: Cardiac cath to rule out ischemic CAD, The procedure and risks described to patient including risk of CVA, MI, bleeding, emergency surgery, death,   or Consent signed  Anesthesia: Moderate sedation with Versed and Fentanyl IV  Estimated blood loss :minimal        After informed consent was obtained and  History and exam reviewed the patient was taken to the cardiac cath lab. The patient had the right groin prepped and draped in sterile fashion. The groin was anesthetized with 2% Xylocaine. Using a thin walled needle the right femoral artery was entered and and .035mm guide wire was inserted. A 5 [place] arterial introducer was advanced through the needle and the wire was removed. The sheath was aspirated and flushed with normal saline. A 5 left [place] catheter was advanced through the sheath over a guide wire and advanced under fluoroscopic guidance into the ascending aorta. The wire was withdrawn and the catheter was aspirated and flushed with normal saline. The catheter was then advanced into the ostium of the left coronary artery under fluoroscopic guidance. Multiple cine images were obtained in different projections of the left coronary artery. The catheter was then withdrawn. A JR4 catheter was subsequently advanced  Through the sheath over a wire and advanced under fluoroscopic guidance in to the ascending aorta. The guide wire was removed and the catheter was aspirated and flushed and subsequently advanced in to the ostium of the right coronary artery. Multiple cine images were then obtained of the right coronary artery. The catheter was then withdrawn. A pigtail catheter was then advanced through the sheath over a guide wire and advanced in to the ascending aorta. The wire was then used to help prolapse the catheter across the aortic valve. Once the catheter was across the valve the wire was withdrawn and the catheter was aspirated and flushed with normal saline. Pressure measurements of the left ventricle were then obtained. Then catheter was then connected to a mechanical injection device for contrast ventriculography that was performed in an GONZALEZ projection. The catheter was then reconnected to a pressure system for a pullback pressure analysis of the aortic valve. The catheter had a guide wire placed in it and the catheter was then withdrawn. The right ileofemoral sheath was injected and the vessel imaged    Hemostasis was obtained by Angioseal/ manual pressure      Complications: None      Estimated blood loss: Minimal      Sedation: Administered per anesthesiology  F       Patient is being transferred to the ICU and she is ventilated. Her condition is considered extremis. Dr. Alice Cortez aborted the procedure for treating her AAA at this point. She is going to the ICU with a balloon pump in place and with orders. Follow her acutely from a cardiac perspective.   Samreen Harper MD, [facility]

## 2020-06-05 NOTE — DISCHARGE SUMMARY
Discharge Summary      Patient:  Gurdeep Larson Date: 6/2/2020  7:09 AM    Discharge Date: 6/2/2020    Admitting Physician: Jessica Vasquez MD     Discharge Physician: same    Admitting Diagnosis: Intrarenal abdominal aortic aneurysm     Discharge Diagnosis: same     Past Medical History:   Diagnosis Date    AAA (abdominal aortic aneurysm) ([address]     Arthritis     Atrial fibrillation ([address]     Paroxysmal artial fib during hospitalization for sepsis; seen by Dr. Jaeger Form, later had stress test; anticoagulated with Plavix    Atypical chest pain 7/30/2013    Carpal tunnel syndrome of left wrist     Chronic pain syndrome     COPD (chronic obstructive pulmonary disease) ([address]     Duodenal ulcer 5/10/2012    Hypertension     Insomnia     Lumbar spondylosis     Mucopurulent chronic bronchitis ([address]     Peripheral arterial occlusive disease ([address]     Post-laminectomy syndrome     Pre-syncope 1/10/2013    1/13 Admitted to [facility] May well have been a seizure. Family observed 3-5 min of shaking of extremities.  Renal artery stenosis ([address] 10/09    [facility] records    SBO (small bowel obstruction) ([address]     Seizure ([address] 1/9/2013    Spinal stenosis         Indication for Admission:   Ms. Zena Anderson is an 79 yo female who was being followed for infrarenal AAA. Her most recent CTA A/P in 12/2019 was significant for AAA measuring 5.3cm, which was increased in comparison to last imaging study in 2016, at which time it measured 3.8. She was concerned about rupture and after reviewing risks and benefits, including but not limited to risk of bleeding, aneurysm rupture, arterial injury, cardiopulmonary event, need for transfusion or ventilation, and risk of death, she wanted to proceed. She was evaluated pre-operatively with a cardiac nuclear medicine scan which was normal.     Hospital Course: The patient was taken to the hybrid operating room and general anesthesia was administered.   The

## 2020-06-06 LAB
BLOOD BANK DISPENSE STATUS: NORMAL
BLOOD BANK PRODUCT CODE: NORMAL
BPU ID: NORMAL
DESCRIPTION BLOOD BANK: NORMAL

## 2023-11-29 NOTE — OP NOTE
Bea Jain- physical therapist from [address] [place] [address] said pts oxygen level is dropping when he walks a good distance. Other than this he is doing very well. Physical therapist said he would only need 3 visit but they would like to add a nursing order to monitor his visits.      Bea Jain- [phone] Operative Note      Patient: Ming Tabor  YOB: 1934  MRN: [id number]    Date of Procedure: 5/27/2020    Pre-Op Diagnosis: Right leg PAD with severe tibial disease and impending failure of bypass     Post-Op Diagnosis: Same       Procedure:  1. Ultrasound guided access of right fem-pop bypass graft  2. Right popliteal artery angioplasty 4mm and 5mm Patrick balloon     Surgeon:  Vee Dan MD     Anesthesia:  Monitored IV sedation  Under my supervision, Versed and fentanyl administered intravenously for moderate sedation. Pulse oximetry, heart rate, and BP were continuously monitored by an independent trained observer present. I spent 56 minutes of face to face sedation time with the patient. Total medications received:  Versed 3 mg IV and Fentanyl 125 mcg IV  Total time of monitored sedation:  56 minutes      Estimated Blood Loss (mL): Minimal     Complications: None     Findings: successful angioplasty of right popliteal artery occlusion with 2 vessel runoff to foot (peroneal and AT)    Indications for Procedure:  Ming Tabor is a 80 y.o. female who was evaluated as an outpatient with known AAA and scheduled for upcoming EVAR. Additionally, she has a right femoral-popliteal bypass graft with recent arterial duplex study which indicated velocities below the threshold for graft patency indicating impending graft failure. I recommended right lower extremity arteriogram.  She presented last week for arteriogram which time access was obtained both through the left femoral and left brachial approaches. Neither of these approaches were successful and she now returns for possible pedal approach, possible antegrade graft approach. Informed consent was obtained after discussion of potential benefits, alternatives and risks of the procedure, including but not limited to bleeding, infection, worsening of arterial insufficiency, and and possible graft failure.   All questions were popliteal occlusion. It was therefore exchanged through a Trailblazer catheter for a 0.018 Runthrough wire. Using a combination of the 0.018 Trailblazer catheter and run-through wire, the popliteal occlusion could only be partially crossed. The Trailblazer catheter was then exchanged for a Navicross catheter and the wire was exchanged for 0.018 Advantage Track  Glidewire. This combination was successful in crossing the popliteal occlusion and the wire was guided down into the peroneal outflow. At this point a 4 mm x 150 mm Patrick balloon was inserted and balloon angioplasty was performed. Follow-up imaging showed improvement in popliteal lumen, though still some irregularity. The balloon was then exchanged for a 5 mm x 100 mm Patrick balloon and repeat angioplasty was performed. Final imaging showed minimal residual stenosis and no evidence of dissection. There was robust filling of the outflow through the anterior tibial and dominantly through the peroneal artery. Initial ACT was drawn and was greater than 300. She was given 40 mg of protamine. Follow-up ACT level was 116 and the sheath was removed at this time and compression held for 15 minutes for hemostasis. The patient tolerated the procedure well and was taken to the post-operative care unit in stable condition.      Maddy Lopez MD      Electronically signed by Maddy Lopez MD on 5/27/2020 at 2:05 PM

## (undated) DEVICE — STOPCOCK FLUID MGMT 3 W 1050 PSI IV ROTATABLE HI PRESSURE

## (undated) DEVICE — DRAPE SURG W82XL124IN SMS INTVENT FEM ANGIO PCH POLY CIR FEN

## (undated) DEVICE — SURE SET-DOUBLE BASIN-LF: Brand: MEDLINE INDUSTRIES, INC.

## (undated) DEVICE — BAG PRSS INFUS 500ML NYL NETTED BK VISIBLE COLOR-CODED G L

## (undated) DEVICE — 3M™ IOBAN™ 2 ANTIMICROBIAL INCISE DRAPE 6648EZ: Brand: IOBAN™ 2

## (undated) DEVICE — DECANTER BAG 9": Brand: MEDLINE INDUSTRIES, INC.

## (undated) DEVICE — KIT CATHETER 20GA L12CM ART CUST

## (undated) DEVICE — TWIST & GO, HPCT, COEX, 60''(ANGIO - ART, HPCT, TWIST & GO, CO-EX, 60")(60766876): Brand: MEDRAD® TWIST & GO STERILE HIGH PRESSURE CONNECTOR TUBING, 150CM (60IN)

## (undated) DEVICE — STERILE POLYISOPRENE POWDER-FREE SURGICAL GLOVES WITH EMOLLIENT COATING: Brand: PROTEXIS

## (undated) DEVICE — RADIFOCUS GLIDEWIRE: Brand: GLIDEWIRE

## (undated) DEVICE — SHEET,DRAPE,53X77,STERILE: Brand: MEDLINE

## (undated) DEVICE — CLIP SM RED INTERN HMOCLP TITAN LIGATING

## (undated) DEVICE — COVER LT HNDL BLU PLAS

## (undated) DEVICE — TFE COATED AMPLATZ ULTRA STIFF WIRE GUIDE - CATHETER EXCHANGE: Brand: AMPLATZ

## (undated) DEVICE — STANDARD HYPODERMIC NEEDLE,ALUMINUM HUB: Brand: MONOJECT

## (undated) DEVICE — KIT ART LN 20GA L12CM FEP RADPQ 0.025X13.75IN SPR GWIRE

## (undated) DEVICE — CATHETER ANGIO 5FR L70CM 0.035IN SEG 20CM PGTL FLSH 20 1

## (undated) DEVICE — SUTURE PERMAHAND SZ 2-0 L12X18IN NONABSORBABLE BLK SILK A185H

## (undated) DEVICE — DRESSING GERM DIA1IN CNTR H DIA7MM BLU CHG ANTIMIC PROTCT

## (undated) DEVICE — Device

## (undated) DEVICE — GAUZE,SPONGE,4"X4",16PLY,XRAY,STRL,LF: Brand: MEDLINE

## (undated) DEVICE — CURITY STRETCH BANDAGE: Brand: CURITY

## (undated) DEVICE — PRESSURE TUBING: Brand: TRUWAVE

## (undated) DEVICE — OLCOTT TORQUE DEVICE: Brand: OLCOTT

## (undated) DEVICE — SYSTEM SKIN CLSR 22CM DERMBND PRINEO

## (undated) DEVICE — PERCLOSE A-T 6F SUTURE-MEDICATED CLOSURE SYSTEM: Brand: PERCLOSE

## (undated) DEVICE — SYRINGE MED 30ML STD CLR PLAS LUERLOCK TIP N CTRL DISP

## (undated) DEVICE — GLOVE SURG SZ 6 L11.2IN FNGR THK9.8MIL STRW LTX POLYMER

## (undated) DEVICE — SUTURE MCRYL SZ 2-0 L27IN ABSRB UD SH L26MM TAPERPOINT NDL Y417H

## (undated) DEVICE — SYRINGE MED 50ML LUERLOCK TIP

## (undated) DEVICE — PACK,UNIVERSAL,NO GOWNS: Brand: MEDLINE

## (undated) DEVICE — STRIP,CLOSURE,WOUND,MEDI-STRIP,1/2X4: Brand: MEDLINE

## (undated) DEVICE — COVER,TABLE,HEAVY DUTY,77"X90",STRL: Brand: MEDLINE

## (undated) DEVICE — INTRODUCER SHTH 6FR L11CM 0.038IN STD SIDEPRT EXTN 3 W

## (undated) DEVICE — PRESSURE MONITORING SET: Brand: TRUWAVE, VAMP PLUS

## (undated) DEVICE — GEL US 20GM NONIRRITATING OVERWRAPPED FILE PCH TRNSMIT

## (undated) DEVICE — SUTURE PERMAHAND SZ 3-0 L18IN NONABSORBABLE BLK SILK BRAID A184H

## (undated) DEVICE — SYRINGE, LUER LOCK, 10ML: Brand: MEDLINE

## (undated) DEVICE — 3M™ STERI-STRIP™ REINFORCED ADHESIVE SKIN CLOSURES, R1548, 1 IN X 5 IN (25 MM X 125 MM), 4 STRIPS/ENVELOPE: Brand: 3M™ STERI-STRIP™

## (undated) DEVICE — SPONGE,LAP,18"X18",DLX,XR,ST,5/PK,40/PK: Brand: MEDLINE

## (undated) DEVICE — CATHETER ANGIO 5FR L65CM 0.038IN KMP SEL SFT RADPQ TIP HI

## (undated) DEVICE — INTENDED FOR TISSUE SEPARATION, AND OTHER PROCEDURES THAT REQUIRE A SHARP SURGICAL BLADE TO PUNCTURE OR CUT.: Brand: BARD-PARKER ® CARBON RIB-BACK BLADES

## (undated) DEVICE — SHEATH INTRO 16FR L33CM OD6.1MM ID5.3MM HYDRPHLC KINK

## (undated) DEVICE — 3M™ TEGADERM™ TRANSPARENT FILM DRESSING FRAME STYLE, 1624W, 2-3/8 IN X 2-3/4 IN (6 CM X 7 CM), 100/CT 4CT/CASE: Brand: 3M™ TEGADERM™

## (undated) DEVICE — TRAY CATHETER 16FR F INCLUDE BARDX IC COMPLT CARE DRNGE BG

## (undated) DEVICE — SURGICAL SET UP - SURE SET: Brand: MEDLINE INDUSTRIES, INC.

## (undated) DEVICE — E-Z CLEAN, NON-STICK, PTFE COATED, ELECTROSURGICAL BLADE ELECTRODE, 2.5 INCH (6.35 CM): Brand: EZ CLEAN

## (undated) DEVICE — APPLICATOR PREP 26ML 0.7% IOD POVACRYLEX 74% ISO ALC ST

## (undated) DEVICE — COVER US PRB W15XL244CM ST SURGICAL/INTRAOPERATIVE W/

## (undated) DEVICE — SUTURE MCRYL SZ 3-0 L27IN ABSRB UD L19MM PS-2 3/8 CIR PRIM Y427H

## (undated) DEVICE — SYRINGE MED 10ML LUERLOCK TIP W/O SFTY DISP

## (undated) DEVICE — 3M™ TEGADERM™ TRANSPARENT FILM DRESSING FRAME STYLE, 1626W, 4 IN X 4-3/4 IN (10 CM X 12 CM), 50/CT 4CT/CASE: Brand: 3M™ TEGADERM™

## (undated) DEVICE — JEWISH HOSPITAL TURNOVER KIT: Brand: MEDLINE INDUSTRIES, INC.

## (undated) DEVICE — PLATE ES AD W 9FT CRD 2

## (undated) DEVICE — SYRINGE 20ML LL S/C 50

## (undated) DEVICE — DRAPE,LAPAROTOMY,PED,STERILE: Brand: MEDLINE

## (undated) DEVICE — STAPLER SKIN H3.9MM WIRE DIA0.58MM CRWN 6.9MM 35 STPL ROT

## (undated) DEVICE — Z INACTIVE USE 2641837 CLIP LIG M BLU TI HRT SHP WIRE HORZ 600 PER BX

## (undated) DEVICE — GUIDEWIRE: Brand: AMPLATZ SUPER STIFF™

## (undated) DEVICE — INTRODUCER SHTH STD 8 FRX11 CM FLX KINK RESIST CANN AVNT +

## (undated) DEVICE — KIT MINI STICKS 5FR

## (undated) DEVICE — DRAPE C ARM UNIV W41XL74IN CLR PLAS XR VELC CLSR POLY STRP

## (undated) DEVICE — SHEATH INTRO 14FR L33CM OD5.3MM ID4.7MM HYDRPHLC KINK

## (undated) DEVICE — SUTURE PROL SZ 5-0 L18IN NONABSORBABLE BLU C-1 L13MM 3/8 8717H

## (undated) DEVICE — SUTURE VCRL SZ 2-0 L18IN ABSRB VLT L26MM SH 1/2 CIR J775D

## (undated) DEVICE — BENTSON WIRE GUIDE 20CM DISTAL FLEXIBILITY WITH SOFTENED TIP: Brand: BENTSON

## (undated) DEVICE — TOWEL,OR,DSP,ST,BLUE,DLX,8/PK,10PK/CS: Brand: MEDLINE

## (undated) DEVICE — SUTURE VCRL SZ 3-0 L27IN ABSRB UD L26MM SH 1/2 CIR J416H

## (undated) DEVICE — CATHETER VASC AD 5FR L65CM 0.038 DIAG KA 2 PERIPH W/O